# Patient Record
Sex: FEMALE | Race: WHITE | NOT HISPANIC OR LATINO | Employment: UNEMPLOYED | URBAN - METROPOLITAN AREA
[De-identification: names, ages, dates, MRNs, and addresses within clinical notes are randomized per-mention and may not be internally consistent; named-entity substitution may affect disease eponyms.]

---

## 2017-12-26 ENCOUNTER — OFFICE VISIT (OUTPATIENT)
Dept: URGENT CARE | Facility: CLINIC | Age: 57
End: 2017-12-26
Payer: COMMERCIAL

## 2017-12-26 PROCEDURE — G0382 LEV 3 HOSP TYPE B ED VISIT: HCPCS

## 2017-12-27 NOTE — PROGRESS NOTES
Assessment   1  Cellulitis (512 9) (L03 [id number])    Plan   Cellulitis    · Clindamycin HCl - 300 MG Oral Capsule; TAKE 1 CAPSULE 3 times daily    Discussion/Summary   Discussion Summary:    Follow up with an eye doctor in 48 hrs if no improvement  Medication Side Effects Reviewed: Possible side effects of new medications were reviewed with the patient/guardian today  Understands and agrees with treatment plan: The treatment plan was reviewed with the patient/guardian  The patient/guardian understands and agrees with the treatment plan    Counseling Documentation With Imm: The patient was counseled regarding instructions for management,-- patient and family education,-- importance of compliance with treatment  Chief Complaint   1  Eye Pain  Chief Complaint Free Text Note Form: C/O red, raised lump on her right upper eyelid x 16 days  Yesterday the area drained small amount of clear liquid and became painful  Pt has been using warm compresses on the area  History of Present Illness   HPI: Started with swelling of lateral aspect of right upper eyelid 16 days ago  Was using warm compresses and area opened yesterday draining clear fluid  Today area is slightly red and is now tender to touch  Hospital Based Practices Required Assessment:      Pain Assessment      the patient states they have pain  The pain is located in the right upper eyelid  The patient describes the pain as aching  (on a scale of 0 to 10, the patient rates the pain at 3 )      Abuse And Domestic Violence Screen       Yes, the patient is safe at home  -- The patient states no one is hurting them  Depression And Suicide Screen  No, the patient has not had thoughts of hurting themself  No, the patient has not felt depressed in the past 7 days  Eye Pain: Clarissa Hamilton presents with complaints of eye pain  Review of Systems   Focused-Female:      Constitutional: no fever-- and-- no chills        ENT: no sore throat,-- no hearing loss-- and-- no hoarseness  Cardiovascular: no chest pain  Respiratory: no cough  Gastrointestinal: no abdominal pain  Musculoskeletal: no myalgias  Integumentary: no rashes  Neurological: no headache  ROS Reviewed:    ROS reviewed  Past Medical History   1  No pertinent past medical history  Active Problems And Past Medical History Reviewed: The active problems and past medical history were reviewed and updated today  Social History    · Never a smoker  Social History Reviewed: The social history was reviewed and updated today  Surgical History   1  Denied: History Of Prior Surgery    Current Meds    1  [facility] CAPS; Therapy: (Recorded:[id number]) to Recorded  Medication List Reviewed: The medication list was reviewed and updated today  Allergies   1  Penicillins    Vitals   Signs   Recorded: [id number] 11:08AM   Temperature: 97 8 F  Heart Rate: 86  Respiration: 18  Systolic: 025  Diastolic: 86  O2 Saturation: 100    Physical Exam        Constitutional      General appearance: No acute distress, well appearing and well nourished  Eyes right upper lateral eyelid with redness, swelling, no drainage  Pupils and irises: Equal, round and reactive to light  Ears, Nose, Mouth, and Throat      External inspection of ears and nose: Normal        Pulmonary      Respiratory effort: No increased work of breathing or signs of respiratory distress  Lymphatic      Palpation of lymph nodes in neck: No lymphadenopathy  Musculoskeletal      Gait and station: Normal        Skin      Skin and subcutaneous tissue: Normal without rashes or lesions         Psychiatric      Mood and affect: Normal        Signatures    Electronically signed by : REJI Gordon; Dec 26 2017 11:36AM EST                       (Author)     Electronically signed by : BROCK Yepez ; Dec 26 2017 11:55AM EST                       (Co-author)

## 2018-01-23 VITALS
OXYGEN SATURATION: 100 % | SYSTOLIC BLOOD PRESSURE: 132 MMHG | HEART RATE: 86 BPM | DIASTOLIC BLOOD PRESSURE: 86 MMHG | RESPIRATION RATE: 18 BRPM | TEMPERATURE: 97.8 F

## 2018-10-02 ENCOUNTER — TRANSCRIBE ORDERS (OUTPATIENT)
Dept: ADMINISTRATIVE | Facility: HOSPITAL | Age: 58
End: 2018-10-02

## 2018-10-02 DIAGNOSIS — Z12.39 SCREENING BREAST EXAMINATION: Primary | ICD-10-CM

## 2018-10-25 ENCOUNTER — HOSPITAL ENCOUNTER (OUTPATIENT)
Dept: MAMMOGRAPHY | Facility: HOSPITAL | Age: 58
Discharge: HOME | End: 2018-10-25
Payer: COMMERCIAL

## 2018-10-25 DIAGNOSIS — Z12.39 SCREENING BREAST EXAMINATION: ICD-10-CM

## 2018-10-25 PROCEDURE — 77067 SCR MAMMO BI INCL CAD: CPT

## 2018-10-25 PROCEDURE — 77063 BREAST TOMOSYNTHESIS BI: CPT

## 2019-10-03 ENCOUNTER — TRANSCRIBE ORDERS (OUTPATIENT)
Dept: ADMINISTRATIVE | Facility: HOSPITAL | Age: 59
End: 2019-10-03

## 2019-10-03 DIAGNOSIS — Z12.31 SCREENING MAMMOGRAM FOR HIGH-RISK PATIENT: Primary | ICD-10-CM

## 2019-10-28 ENCOUNTER — HOSPITAL ENCOUNTER (OUTPATIENT)
Dept: MAMMOGRAPHY | Facility: HOSPITAL | Age: 59
Discharge: HOME | End: 2019-10-28
Payer: COMMERCIAL

## 2019-10-28 VITALS — HEIGHT: 65 IN | BODY MASS INDEX: 31.16 KG/M2 | WEIGHT: 187 LBS

## 2019-10-28 DIAGNOSIS — Z12.31 SCREENING MAMMOGRAM FOR HIGH-RISK PATIENT: ICD-10-CM

## 2019-10-28 PROCEDURE — 77063 BREAST TOMOSYNTHESIS BI: CPT

## 2019-10-28 PROCEDURE — 77067 SCR MAMMO BI INCL CAD: CPT

## 2020-09-23 ENCOUNTER — TELEPHONE (OUTPATIENT)
Dept: PAIN MEDICINE | Facility: CLINIC | Age: 60
End: 2020-09-23

## 2020-09-24 ENCOUNTER — OFFICE VISIT (OUTPATIENT)
Dept: OBGYN CLINIC | Facility: CLINIC | Age: 60
End: 2020-09-24
Payer: COMMERCIAL

## 2020-09-24 ENCOUNTER — APPOINTMENT (OUTPATIENT)
Dept: RADIOLOGY | Facility: CLINIC | Age: 60
End: 2020-09-24
Payer: COMMERCIAL

## 2020-09-24 VITALS
TEMPERATURE: 97.9 F | HEART RATE: 90 BPM | WEIGHT: 202 LBS | DIASTOLIC BLOOD PRESSURE: 82 MMHG | HEIGHT: 65 IN | BODY MASS INDEX: 33.66 KG/M2 | SYSTOLIC BLOOD PRESSURE: 127 MMHG

## 2020-09-24 DIAGNOSIS — M17.10 PRIMARY LOCALIZED OSTEOARTHRITIS OF KNEE: Primary | ICD-10-CM

## 2020-09-24 DIAGNOSIS — M25.561 RIGHT KNEE PAIN, UNSPECIFIED CHRONICITY: ICD-10-CM

## 2020-09-24 PROCEDURE — 99203 OFFICE O/P NEW LOW 30 MIN: CPT | Performed by: ORTHOPAEDIC SURGERY

## 2020-09-24 PROCEDURE — 73564 X-RAY EXAM KNEE 4 OR MORE: CPT

## 2020-09-24 NOTE — ASSESSMENT & PLAN NOTE
57-year-old female with very mild osteoarthritis of the knee  We discussed the diagnosis and reviewed the radiographs  I discussed anti-inflammatories, icing, low-impact exercise, weight loss  We also discussed different types of injections  At this point I do not recommended injection and she is in agreement with this  She has increasing pain or flare-ups of be happy to see her back for injection consideration  She shows good understanding of the process  Follow-up as needed

## 2020-09-24 NOTE — PROGRESS NOTES
Assessment:     1  Primary localized osteoarthritis of knee          Plan:     Problem List Items Addressed This Visit        Musculoskeletal and Integument    Primary localized osteoarthritis of knee - Primary     53-year-old female with very mild osteoarthritis of the knee  We discussed the diagnosis and reviewed the radiographs  I discussed anti-inflammatories, icing, low-impact exercise, weight loss  We also discussed different types of injections  At this point I do not recommended injection and she is in agreement with this  She has increasing pain or flare-ups of be happy to see her back for injection consideration  She shows good understanding of the process  Follow-up as needed  Relevant Orders    XR knee 4+ vw right injury           Patient ID: Gopi Gallegos is a 64 y o  female  Chief Complaint:  Right knee pain    HPI:    53-year-old female here today for problem she has been having with her right knee  This been going on for nearly two years  There is no specific injury but she does state that she has had multiple falls  Her main complaint is that she has pain deep in the knee when she kneels  She gets occasional swelling that her  is noted but she does not feel  She walks on a regular basis  It bothers her more with weather changes  She points to the front of the knee but states it is deep inside when it hurts  She denies any locking, catching, or giving way  She has not had any treatment for it  She has not done any therapy or had any injections  She does not wear any brace  She is just wondering what is going on with that and was curious what can be done for it  Allergy:  Allergies   Allergen Reactions    Penicillins Rash       Medications:  all current active meds have been reviewed    Past Medical History:  History reviewed  No pertinent past medical history      Past Surgical History:  Past Surgical History:   Procedure Laterality Date    ANKLE FRACTURE SURGERY Right 2010       Family History:  Family History   Problem Relation Age of Onset    Heart disease Mother     Hypertension Mother     Lung cancer Father     Stomach cancer Father     No Known Problems Sister     No Known Problems Maternal Grandmother     No Known Problems Maternal Grandfather     No Known Problems Paternal Grandmother     No Known Problems Paternal Grandfather     No Known Problems Sister     No Known Problems Sister     No Known Problems Maternal Aunt        Social History:  Social History     Substance and Sexual Activity   Alcohol Use Not Currently     Social History     Substance and Sexual Activity   Drug Use Not Currently     Social History     Tobacco Use   Smoking Status Never Smoker   Smokeless Tobacco Never Used           ROS:  Review of Systems   Constitutional: Negative  HENT: Negative  Eyes: Negative  Respiratory: Negative  Cardiovascular: Negative  Gastrointestinal: Negative  Endocrine: Negative  Genitourinary: Negative  Musculoskeletal: Positive for arthralgias  Skin: Negative  Allergic/Immunologic: Negative  Neurological: Negative  Hematological: Negative  Psychiatric/Behavioral: Negative  Objective:  BP Readings from Last 1 Encounters:   09/24/20 127/82      Wt Readings from Last 1 Encounters:   09/24/20 91 6 kg (202 lb)        BMI:   Estimated body mass index is 33 61 kg/m² as calculated from the following:    Height as of this encounter: 5' 5" (1 651 m)  Weight as of this encounter: 91 6 kg (202 lb)  EXAM:   Physical Exam  Vitals signs reviewed  Constitutional:       General: She is not in acute distress  Appearance: She is well-developed  She is not diaphoretic  HENT:      Head: Normocephalic and atraumatic  Eyes:      General:         Right eye: No discharge  Left eye: No discharge  Neck:      Musculoskeletal: Normal range of motion and neck supple  Trachea: No tracheal deviation  Cardiovascular:      Rate and Rhythm: Normal rate and regular rhythm  Pulmonary:      Effort: Pulmonary effort is normal  No respiratory distress  Breath sounds: Normal breath sounds  Chest:      Chest wall: No tenderness  Abdominal:      General: There is no distension  Palpations: Abdomen is soft  Tenderness: There is no abdominal tenderness  Musculoskeletal:      Right knee: She exhibits no effusion  Left knee: She exhibits no effusion  Skin:     General: Skin is warm and dry  Findings: No erythema  Neurological:      Mental Status: She is alert  Psychiatric:         Behavior: Behavior normal        Right Knee Exam   Right knee exam is normal     Muscle Strength   The patient has normal right knee strength  Tenderness   The patient is experiencing no tenderness  Range of Motion   The patient has normal right knee ROM  Tests   Rey:  Medial - negative Lateral - negative  Varus: negative Valgus: negative  Lachman:  Anterior - negative      Drawer:  Posterior - negative  Pivot shift: negative  Patellar apprehension: negative    Other   Erythema: absent  Sensation: normal  Pulse: present  Swelling: none  Effusion: no effusion present      Left Knee Exam   Left knee exam is normal     Tenderness   The patient is experiencing no tenderness  Range of Motion   The patient has normal left knee ROM  Tests   Rey:  Medial - negative Lateral - negative  Varus: negative Valgus: negative  Lachman:  Anterior - negative      Drawer:  Posterior - negative  Pivot shift: negative  Patellar apprehension: negative    Other   Erythema: absent  Sensation: normal  Pulse: present  Swelling: none  Effusion: no effusion present              Radiographs:  I have personally reviewed pertinent films in PACS and my interpretation is Mild degenerative changes of the medial and patellofemoral compartment  No fractures or dislocations  Congruent patellofemoral joint

## 2020-10-10 ENCOUNTER — TRANSCRIBE ORDERS (OUTPATIENT)
Dept: ADMINISTRATIVE | Facility: HOSPITAL | Age: 60
End: 2020-10-10

## 2020-10-10 DIAGNOSIS — Z12.31 ENCOUNTER FOR SCREENING MAMMOGRAM FOR MALIGNANT NEOPLASM OF BREAST: Primary | ICD-10-CM

## 2020-11-02 ENCOUNTER — HOSPITAL ENCOUNTER (OUTPATIENT)
Dept: MAMMOGRAPHY | Facility: HOSPITAL | Age: 60
Discharge: HOME | End: 2020-11-02
Payer: COMMERCIAL

## 2020-11-02 VITALS — HEIGHT: 65 IN | WEIGHT: 203 LBS | BODY MASS INDEX: 33.82 KG/M2

## 2020-11-02 DIAGNOSIS — Z12.31 ENCOUNTER FOR SCREENING MAMMOGRAM FOR MALIGNANT NEOPLASM OF BREAST: ICD-10-CM

## 2020-11-02 PROCEDURE — 77063 BREAST TOMOSYNTHESIS BI: CPT

## 2020-11-02 PROCEDURE — 77067 SCR MAMMO BI INCL CAD: CPT

## 2021-03-10 ENCOUNTER — IMMUNIZATIONS (OUTPATIENT)
Dept: FAMILY MEDICINE CLINIC | Facility: HOSPITAL | Age: 61
End: 2021-03-10

## 2021-03-10 DIAGNOSIS — Z23 ENCOUNTER FOR IMMUNIZATION: Primary | ICD-10-CM

## 2021-03-10 PROCEDURE — 91300 SARS-COV-2 / COVID-19 MRNA VACCINE (PFIZER-BIONTECH) 30 MCG: CPT

## 2021-03-10 PROCEDURE — 0001A SARS-COV-2 / COVID-19 MRNA VACCINE (PFIZER-BIONTECH) 30 MCG: CPT

## 2021-03-31 ENCOUNTER — IMMUNIZATIONS (OUTPATIENT)
Dept: FAMILY MEDICINE CLINIC | Facility: HOSPITAL | Age: 61
End: 2021-03-31

## 2021-03-31 DIAGNOSIS — Z23 ENCOUNTER FOR IMMUNIZATION: Primary | ICD-10-CM

## 2021-03-31 PROCEDURE — 0002A SARS-COV-2 / COVID-19 MRNA VACCINE (PFIZER-BIONTECH) 30 MCG: CPT

## 2021-03-31 PROCEDURE — 91300 SARS-COV-2 / COVID-19 MRNA VACCINE (PFIZER-BIONTECH) 30 MCG: CPT

## 2021-11-03 ENCOUNTER — HOSPITAL ENCOUNTER (OUTPATIENT)
Dept: MAMMOGRAPHY | Facility: HOSPITAL | Age: 61
Discharge: HOME | End: 2021-11-03
Payer: COMMERCIAL

## 2021-11-03 VITALS — HEIGHT: 65 IN | BODY MASS INDEX: 33.32 KG/M2 | WEIGHT: 200 LBS

## 2021-11-03 DIAGNOSIS — Z12.31 ENCOUNTER FOR SCREENING MAMMOGRAM FOR MALIGNANT NEOPLASM OF BREAST: ICD-10-CM

## 2021-11-03 PROCEDURE — 77063 BREAST TOMOSYNTHESIS BI: CPT

## 2021-11-03 PROCEDURE — 77067 SCR MAMMO BI INCL CAD: CPT

## 2022-05-20 DIAGNOSIS — R52 PAIN: Primary | ICD-10-CM

## 2022-05-28 ENCOUNTER — NURSE TRIAGE (OUTPATIENT)
Dept: OTHER | Facility: OTHER | Age: 62
End: 2022-05-28

## 2022-05-28 NOTE — TELEPHONE ENCOUNTER
Paged on call provider due to pt in 7/10 pain and doesn't want to go to ER  Was told several times she needs to see neurologist but cant get in to September  [facility] on call provider will contact pt directly  Reason for Disposition   [1] MILD-MODERATE mouth pain AND [2] present > 3 days    Answer Assessment - Initial Assessment Questions  1  ONSET: "When did the mouth start hurting?" (e g , hours or days ago)       Nerve pain since october  2  SEVERITY: "How bad is the pain?" (Scale 1-10; mild, moderate or severe)    - MILD (1-3):  doesn't interfere with eating or normal activities    - MODERATE (4-7): interferes with eating some solids and normal activities    - SEVERE (8-10):  excruciating pain, interferes with most normal activities    - SEVERE DYSPHAGIA: can't swallow liquids, drooling      7/10  3  SORES: "Are there any sores or ulcers in the mouth?" If Yes, ask: "What part of the mouth are the sores in?"      Nerve pain  4  FEVER: "Do you have a fever?" If Yes, ask: "What is your temperature, how was it measured, and when did it start?"      Low grade last night didn't meassure  5  CAUSE: "What do you think is causing the mouth pain?"      Nerve pain  6   OTHER SYMPTOMS: "Do you have any other symptoms?" (e g , difficulty breathing)      Headache, worsens when talk, eating, sleeping    Protocols used: MOUTH PAIN-ADULT-

## 2022-05-28 NOTE — TELEPHONE ENCOUNTER
Regarding: mouth pain   ----- Message from Osman Velazquez sent at 5/28/2022  8:45 AM EDT -----  "I had a tooth removed and they couldn't put the implant in because of the nerve  They don't know why it is hurting and want me to see a neurologist but I can't get in until September   I am having pain when I talk, eat, drink and I'm getting headaches and numbness in my lipbs

## 2022-11-04 ENCOUNTER — HOSPITAL ENCOUNTER (OUTPATIENT)
Dept: MAMMOGRAPHY | Facility: HOSPITAL | Age: 62
End: 2022-11-04
Attending: OBSTETRICS & GYNECOLOGY

## 2022-11-04 VITALS — BODY MASS INDEX: 34.99 KG/M2 | WEIGHT: 210 LBS | HEIGHT: 65 IN

## 2022-11-04 DIAGNOSIS — Z12.31 SCREENING MAMMOGRAM FOR HIGH-RISK PATIENT: ICD-10-CM

## 2022-12-28 ENCOUNTER — TELEPHONE (OUTPATIENT)
Dept: NEUROLOGY | Facility: CLINIC | Age: 62
End: 2022-12-28

## 2022-12-28 NOTE — TELEPHONE ENCOUNTER
Attempted to reach pt to offer earlier appt off the waitlist on 12/29 at 9 am  [facility]   Provided call back number and instructed pt to call back if interested in earlier appt   Stated that we could not guarantee the appt would still be available by the time they call back

## 2022-12-28 NOTE — TELEPHONE ENCOUNTER
Pt called back stating she will take the appointment for tomorrow 12/29 at 9am  Please call patient to confirm

## 2022-12-29 NOTE — TELEPHONE ENCOUNTER
Called patient and left a message  Advised her that the appointment, for today at 9am was filled  Advised her she will keep her May appointment, and she is still on the wait list  If we get another sooner appointment, we will callback to offer one if that does happen  Apologized, and advised the patient to callback w/any questions/concerns

## 2023-03-28 ENCOUNTER — TELEPHONE (OUTPATIENT)
Dept: NEUROLOGY | Facility: CLINIC | Age: 63
End: 2023-03-28

## 2023-03-28 NOTE — TELEPHONE ENCOUNTER
Called pt from Dr Jewell Victoria wait list to offer her 3-29-23 at 2 pm with him in [place] and she accepted

## 2023-03-29 ENCOUNTER — CONSULT (OUTPATIENT)
Dept: NEUROLOGY | Facility: CLINIC | Age: 63
End: 2023-03-29

## 2023-03-29 VITALS
HEART RATE: 77 BPM | HEIGHT: 65 IN | SYSTOLIC BLOOD PRESSURE: 136 MMHG | WEIGHT: 223.4 LBS | DIASTOLIC BLOOD PRESSURE: 82 MMHG | BODY MASS INDEX: 37.22 KG/M2 | OXYGEN SATURATION: 97 % | TEMPERATURE: 98.3 F

## 2023-03-29 DIAGNOSIS — G44.89 HEADACHE SYNDROME: ICD-10-CM

## 2023-03-29 DIAGNOSIS — Z13.89 OBESITY SCREEN: ICD-10-CM

## 2023-03-29 DIAGNOSIS — G43.109 MIGRAINE AURA WITHOUT HEADACHE: Primary | ICD-10-CM

## 2023-03-29 DIAGNOSIS — R29.818 SUSPECTED SLEEP APNEA: ICD-10-CM

## 2023-03-29 RX ORDER — FEXOFENADINE HYDROCHLORIDE 60 MG/1
60 TABLET, FILM COATED ORAL DAILY
COMMUNITY

## 2023-03-29 NOTE — PROGRESS NOTES
[facility] Consult  PATIENT:  Lety Echevarria  MRN:  [id number]  :  1960  DATE OF SERVICE:  3/29/2023  REFERRED BY: Geeta John DMD  PMD: Uriel Carroll    Assessment/Plan:     Lety Echevarria is a delightful 63 y o  female with a past medical history that includes allergies referred here for evaluation of headache  Initial evaluation 3/29/2023     Ms Tory Thurman reports a longstanding history of headaches since she was about 29years old, but reports that over the last year she started to develop a different type of headache located over the right frontal region of her head  When this pain initially started, she had recently undergone some dental work and was also experiencing jaw pain at that time, but that has since improved  Furthermore, she also endorses a visual aura a few times per month, but does not have any associated headache  I believe she may be suffering from a combination of migraine aura without headaches as well as possible tension headaches  I have recommended that we try magnesium and B2 supplements for prevention  There may be a component of sleep apnea contributing to her symptoms and I recommended that she undergo a sleep study for evaluation, but she was not interested in obtaining this at this time    Due to a change in her headache type as well as the significant frequency at this time, I would like further evaluation with an MRI of the brain with and without contrast     Migraine aura without headache  Headache syndrome  Suspected sleep apnea  -     MRI brain with and without contrast; Future    Workup:  - Neurologic assessment reveals unremarkable neurological exam   - Due to increased frequency and severity of headaches and migraines I recommend further evaluation with MRI brain with and without contrast to rule out structural or treatable causes of symptoms     Preventative:  - we discussed headache hygiene and lifestyle factors that may improve headaches  - Mg and B2  - Currently on through other providers: None  - Past/ failed/contraindicated: None  - future options: Topamax, TCA/SNRI, CGRP med, botox    Acute:  - discussed not taking over-the-counter or prescription pain medications more than 2-3 days per week to prevent medication overuse/rebound headache  - Currently on through other providers: None  - Past/ failed/contraindicated: None  - future options:  Triptan, prochlorperazine, Toradol IM or p o , could consider trial of 5 days of Depakote 500 mg nightly or dexamethasone 2 mg daily for prolonged migraine, jacey Duarte nurtec  Patient instructions   Additional Testing:   Neurodiagnostic workup: MRI Brain ordered    Headache Calendar  Please maintain a headache calendar  Consider using phone applications such as Migraine [facility] or [employer] [facility] Migraine Tracker    Headache/migraine treatment:   Acute medications (for immediate treatment of a headache): It is ok to take ibuprofen, acetaminophen or naproxen (Advil, Tylenol,  Aleve, Excedrin) if they help your headaches you should limit these to No more than 2-3 times a week to avoid medication overuse/rebound headaches  Over the counter preventive supplements for headaches/migraines (if you try, try for 3 months straight)  (to take every day to help prevent headaches - not to take at the time of headache):  [x] Magnesium 400mg daily (If any diarrhea or upset stomach, decrease dose  as tolerated) - oxide or glycinate  [x] Riboflavin (Vitamin B2) 400mg daily - (FYI B2 may make your urine bright/neon yellow)    Lifestyle Recommendations:  [x] SLEEP - Maintain a regular sleep schedule: Adults need at least 7-8 hours of uninterrupted a night   Maintain good sleep hygiene:  Going to bed and waking up at consistent times, avoiding excessive daytime naps, avoiding caffeinated beverages in the evening, avoid excessive stimulation in the evening and generally using bed primarily for "sleeping  One hour before bedtime would recommend turning lights down lower, decreasing your activity (may read quietly, listen to music at a low volume)  When you get into bed, should eliminate all technology (no texting, emailing, playing with your phone, iPad or tablet in bed)  [x] HYDRATION - Maintain good hydration  Drink  2L of fluid a day (4 typical small water bottles)  [x] DIET - Maintain good nutrition  In particular don't skip meals and try and eat healthy balanced meals regularly  [x] TRIGGERS - Look for other triggers and avoid them: Limit caffeine to 1-2 cups a day or less  Avoid dietary triggers that you have noticed bring on your headaches (this could include aged cheese, peanuts, MSG, aspartame and nitrates)  [x] EXERCISE - physical exercise as we all know is good for you in many ways, and not only is good for your heart, but also is beneficial for your mental health, cognitive health and  chronic pain/headaches  I would encourage at the least 5 days of physical exercise weekly for at least 30 minutes  Education and Follow-up  [x] Please call with any questions or concerns  Of course if any new concerning symptoms go to the emergency department  [x] Follow up in 6 months   CC: We had the pleasure of evaluating Ander Can in neurological consultation today  Ander Can is a  right handed female who presents today for evaluation of headaches  History obtained from patient as well as available medical record review  History of Present Illness:   Current medical illnesses  or past medical history include allergies      Headaches started at what age? 29years old  How often do the headaches occur?   - as of 3/29/2023: 28/30  What time of the day do the headaches start? No particular time of day  How long do the headaches last? 5-10 minutes (2 times per day at most)  Are you ever headache free?  No    Aura? with aura - describes flashing \"worm\" in her vision (happens about 4-5 times " per month; not associated with headache)     Where is your headache located and pain quality? Right frontal; aching pain  What is the intensity of pain? Worst 3-4/10, Average: 2-3/10  Associated symptoms: None    Things that make the headache worse? No specific movements    Headache triggers:  None    Have you seen someone else for headaches or pain? No  Have you had trigger point injection performed and how often? No  Have you had Botox injection performed and how often? No   Have you had epidural injections or transforaminal injections performed? No  Are you current pregnant or planning on getting pregnant? N/A  Have you ever had any Brain imaging? no    Last eye exam: April 2022 - normal, dilated exam    What medications do you take or have you taken for your headaches?    ABORTIVE:    OTC medications: Tylenol (not often)  Prescription: None    Past/ failed/contraindicated:  OTC medications: None  Prescription: None    PREVENTIVE:   None    Past/ failed/contraindicated:  None    LIFESTYLE  Sleep   - averages: about 5 hours per night  Problems falling asleep?:   Yes, sometimes  Problems staying asleep?:  No  - Positive history of snoring  - No prior sleep studies    Physical activity: Exercises about 5-6 days per week    Water: about 8-10 cups per day  Caffeine: None    Mood:   Denies history of anxiety or depression or other diagnosed mood disorder    The following portions of the patient's history were reviewed and updated as appropriate: allergies, current medications, past family history, past medical history, past social history, past surgical history and problem list     Pertinent family history:  Family history of headaches:  no known family members with significant headaches  Any family history of aneurysms - No    Pertinent social history:  Work: Not working  Education: 2 years college  Lives with     Illicit Drugs: denies  Alcohol/tobacco: Denies tobacco use, alcohol intake: social drinker    Past Medical History:   History reviewed  No pertinent past medical history  Patient Active Problem List   Diagnosis   • Primary localized osteoarthritis of knee       Medications:      Current Outpatient Medications   Medication Sig Dispense Refill   • fexofenadine (ALLEGRA) 60 MG tablet Take 60 mg by mouth daily       No current facility-administered medications for this visit  Allergies: Allergies   Allergen Reactions   • Penicillins Rash       Family History:     Family History   Problem Relation Age of Onset   • Heart disease Mother    • Hypertension Mother    • Lung cancer Father    • Stomach cancer Father    • No Known Problems Sister    • No Known Problems Maternal Grandmother    • No Known Problems Maternal Grandfather    • No Known Problems Paternal Grandmother    • No Known Problems Paternal Grandfather    • No Known Problems Sister    • No Known Problems Sister    • No Known Problems Maternal Aunt        Social History:       Social History     Socioeconomic History   • Marital status: /Civil Union     Spouse name: Not on file   • Number of children: Not on file   • Years of education: Not on file   • Highest education level: Not on file   Occupational History   • Not on file   Tobacco Use   • Smoking status: Never   • Smokeless tobacco: Never   Vaping Use   • Vaping Use: Never used   Substance and Sexual Activity   • Alcohol use:  Yes   • Drug use: Not Currently   • Sexual activity: Not Currently   Other Topics Concern   • Not on file   Social History Narrative   • Not on file     Social Determinants of Health     Financial Resource Strain: Not on file   Food Insecurity: Not on file   Transportation Needs: Not on file   Physical Activity: Not on file   Stress: Not on file   Social Connections: Not on file   Intimate Partner Violence: Not on file   Housing Stability: Not on file     Objective:   Physical Exam: "Vitals:            Constitutional:  /82 (BP Location: Left arm, Patient Position: Sitting, Cuff Size: Large)   Pulse 77   Temp 98 3 °F (36 8 °C) (Temporal)   Ht 5' 5\" (1 651 m)   Wt 101 kg (223 lb 6 4 oz)   SpO2 97%   BMI 37 18 kg/m²   BP Readings from Last 3 Encounters:   03/29/23 136/82   09/24/20 127/82   12/26/17 132/86     Pulse Readings from Last 3 Encounters:   03/29/23 77   09/24/20 90   12/26/17 86         Well developed, well nourished, well groomed  No dysmorphic features  HEENT:  Normocephalic atraumatic  Oropharynx is clear and moist  No oral mucosal lesions  Chest:  Respirations regular and unlabored  Cardiovascular:  Distal extremities warm without palpable edema or tenderness, no observed significant swelling  Musculoskeletal:  (see below under neurologic exam for evaluation of motor function and gait)   Skin:  warm and dry, not diaphoretic  No apparent birthmarks or stigmata of neurocutaneous disease  Psychiatric:  Normal behavior and appropriate affect       Neurological Examination:     Mental status/cognitive function:   Orientated to time, place and person  Recent and remote memory intact  Attention span and concentration as well as fund of knowledge are appropriate for age  Normal language and spontaneous speech  Cranial Nerves:  II-visual fields full  Fundi poorly visualized due to pupillary constriction  III, IV, VI-Pupils were equal, round, and reactive to light and accomodation  Extraocular movements were full and conjugate without nystagmus  Conjugate gaze, normal smooth pursuits, normal saccades   V-facial sensation symmetric  VII-facial expression symmetric, intact forehead wrinkle, strong eye closure, symmetric smile    VIII-hearing grossly intact bilaterally   IX, X-palate elevation symmetric, no dysarthria  XI-shoulder shrug strength intact    XII-tongue protrusion midline  Motor Exam: symmetric bulk and tone throughout, no pronator drift   " Power/strength 5/5 bilateral upper and lower extremities, no atrophy, fasciculations or abnormal movements noted  Sensory: grossly intact light touch in all extremities  Reflexes: brachioradialis 2+, biceps 2+, knee 2+, ankle 2+ bilaterally  No ankle clonus  Coordination: Finger nose finger intact bilaterally, no apparent dysmetria, ataxia or tremor noted  Gait: steady casual and tandem gait  Pertinent lab results: None     Pertinent Imaging: None  Review of Systems:   Constitutional: Negative  Negative for appetite change and fever  HENT: Negative  Negative for hearing loss, tinnitus, trouble swallowing and voice change  Eyes: Negative  Negative for photophobia, pain and visual disturbance  Respiratory: Negative  Negative for shortness of breath  Cardiovascular: Negative  Negative for palpitations  Gastrointestinal: Negative  Negative for nausea and vomiting  Endocrine: Negative  Negative for cold intolerance  Genitourinary: Negative  Negative for dysuria, frequency and urgency  Musculoskeletal: Negative  Negative for gait problem, myalgias and neck pain  Skin: Negative  Negative for rash  Allergic/Immunologic: Negative  Neurological: Positive for headaches  Negative for dizziness, tremors, seizures, syncope, facial asymmetry, speech difficulty, weakness, light-headedness and numbness  Hematological: Negative  Does not bruise/bleed easily  Psychiatric/Behavioral: Negative  Negative for confusion, hallucinations and sleep disturbance  All other systems reviewed and are negative  I have spent 30 minutes with the patient today in which greater than 50% of this time was spent in counseling/coordination of care regarding Prognosis, Risks and benefits of tx options, Patient and family education, Impressions, Documenting in the medical record and Obtaining or reviewing history    I also spent 10 minutes non face to face for this patient the same day       Activity Minutes   Precharting/reviewing 5   Patient care/counseling 30   Postcharting/care coordination 5       Author:  Kiera Ibarra DO 3/29/2023 2:07 PM

## 2023-03-29 NOTE — PATIENT INSTRUCTIONS
Additional Testing:   Neurodiagnostic workup: MRI Brain ordered    Headache Calendar  Please maintain a headache calendar  Consider using phone applications such as Migraine [facility] or [facility] Migraine Tracker    Headache/migraine treatment:   Acute medications (for immediate treatment of a headache): It is ok to take ibuprofen, acetaminophen or naproxen (Advil, Tylenol,  Aleve, Excedrin) if they help your headaches you should limit these to No more than 2-3 times a week to avoid medication overuse/rebound headaches  Over the counter preventive supplements for headaches/migraines (if you try, try for 3 months straight)  (to take every day to help prevent headaches - not to take at the time of headache):  [x] Magnesium 400mg daily (If any diarrhea or upset stomach, decrease dose  as tolerated) - oxide or glycinate  [x] Riboflavin (Vitamin B2) 400mg daily - (FYI B2 may make your urine bright/neon yellow)    Lifestyle Recommendations:  [x] SLEEP - Maintain a regular sleep schedule: Adults need at least 7-8 hours of uninterrupted a night  Maintain good sleep hygiene:  Going to bed and waking up at consistent times, avoiding excessive daytime naps, avoiding caffeinated beverages in the evening, avoid excessive stimulation in the evening and generally using bed primarily for sleeping  One hour before bedtime would recommend turning lights down lower, decreasing your activity (may read quietly, listen to music at a low volume)  When you get into bed, should eliminate all technology (no texting, emailing, playing with your phone, iPad or tablet in bed)  [x] HYDRATION - Maintain good hydration  Drink  2L of fluid a day (4 typical small water bottles)  [x] DIET - Maintain good nutrition  In particular don't skip meals and try and eat healthy balanced meals regularly  [x] TRIGGERS - Look for other triggers and avoid them: Limit caffeine to 1-2 cups a day or less   Avoid dietary triggers that you have noticed bring on your headaches (this could include aged cheese, peanuts, MSG, aspartame and nitrates)  [x] EXERCISE - physical exercise as we all know is good for you in many ways, and not only is good for your heart, but also is beneficial for your mental health, cognitive health and  chronic pain/headaches  I would encourage at the least 5 days of physical exercise weekly for at least 30 minutes  Education and Follow-up  [x] Please call with any questions or concerns  Of course if any new concerning symptoms go to the emergency department    [x] Follow up in 6 months

## 2023-04-19 ENCOUNTER — PATIENT MESSAGE (OUTPATIENT)
Dept: NEUROLOGY | Facility: CLINIC | Age: 63
End: 2023-04-19

## 2023-04-19 DIAGNOSIS — F40.240 CLAUSTROPHOBIA: ICD-10-CM

## 2023-04-19 DIAGNOSIS — G43.109 MIGRAINE AURA WITHOUT HEADACHE: Primary | ICD-10-CM

## 2023-04-24 RX ORDER — LORAZEPAM 0.5 MG/1
0.5 TABLET ORAL
Qty: 2 TABLET | Refills: 0 | Status: SHIPPED | OUTPATIENT
Start: 2023-04-24

## 2023-05-03 ENCOUNTER — TELEPHONE (OUTPATIENT)
Dept: NEUROLOGY | Facility: CLINIC | Age: 63
End: 2023-05-03

## 2023-05-03 NOTE — TELEPHONE ENCOUNTER
Received VM  Pt is at her MRI and forgot her script   ________________________________    Return call to pt  Pt stated she was able to complete her MRI  Nothing further needed

## 2023-05-07 DIAGNOSIS — H81.09 MENIERE DISEASE: Primary | ICD-10-CM

## 2023-05-07 NOTE — PROGRESS NOTES
MRI brain with and without contrast showed cochlear fluid signal asymmetry with a unilateral increase on the right  Plan to place ENT referral to consider further imaging if appropriate

## 2023-09-29 ENCOUNTER — TELEPHONE (OUTPATIENT)
Dept: NEUROLOGY | Facility: CLINIC | Age: 63
End: 2023-09-29

## 2023-09-29 NOTE — TELEPHONE ENCOUNTER
LMOM to confirm appt on 10//2/23 with Dr. Molly Cm in [facility]. Reminded pt to bring in image disc.

## 2023-10-02 ENCOUNTER — OFFICE VISIT (OUTPATIENT)
Dept: NEUROLOGY | Facility: CLINIC | Age: 63
End: 2023-10-02
Payer: COMMERCIAL

## 2023-10-02 VITALS
TEMPERATURE: 98.1 F | HEART RATE: 65 BPM | SYSTOLIC BLOOD PRESSURE: 138 MMHG | BODY MASS INDEX: 38.19 KG/M2 | WEIGHT: 229.2 LBS | OXYGEN SATURATION: 99 % | DIASTOLIC BLOOD PRESSURE: 90 MMHG | HEIGHT: 65 IN

## 2023-10-02 DIAGNOSIS — G43.109 MIGRAINE AURA WITHOUT HEADACHE: Primary | ICD-10-CM

## 2023-10-02 DIAGNOSIS — E66.9 OBESITY (BMI 30-39.9): ICD-10-CM

## 2023-10-02 PROCEDURE — 99214 OFFICE O/P EST MOD 30 MIN: CPT | Performed by: STUDENT IN AN ORGANIZED HEALTH CARE EDUCATION/TRAINING PROGRAM

## 2023-10-02 NOTE — PATIENT INSTRUCTIONS
Headache Calendar  Please maintain a headache calendar  Consider using phone applications such as Migraine [facility] or [employer] Migraine Tracker     Headache/migraine treatment:   Acute medications (for immediate treatment of a headache): It is ok to take ibuprofen, acetaminophen or naproxen (Advil, Tylenol,  Aleve, Excedrin) if they help your headaches you should limit these to No more than 2-3 times a week to avoid medication overuse/rebound headaches. Over the counter preventive supplements for headaches/migraines (if you try, try for 3 months straight)  (to take every day to help prevent headaches - not to take at the time of headache):  [x] Magnesium 400mg daily (If any diarrhea or upset stomach, decrease dose  as tolerated) - oxide or glycinate  [x] Riboflavin (Vitamin B2) 400mg daily - (FYI B2 may make your urine bright/neon yellow)     Lifestyle Recommendations:  [x] SLEEP - Maintain a regular sleep schedule: Adults need at least 7-8 hours of uninterrupted a night. Maintain good sleep hygiene:  Going to bed and waking up at consistent times, avoiding excessive daytime naps, avoiding caffeinated beverages in the evening, avoid excessive stimulation in the evening and generally using bed primarily for sleeping. One hour before bedtime would recommend turning lights down lower, decreasing your activity (may read quietly, listen to music at a low volume). When you get into bed, should eliminate all technology (no texting, emailing, playing with your phone, iPad or tablet in bed). [x] HYDRATION - Maintain good hydration. Drink  2L of fluid a day (4 typical small water bottles)  [x] DIET - Maintain good nutrition. In particular don't skip meals and try and eat healthy balanced meals regularly. [x] TRIGGERS - Look for other triggers and avoid them: Limit caffeine to 1-2 cups a day or less.  Avoid dietary triggers that you have noticed bring on your headaches (this could include aged cheese, peanuts, MSG, aspartame and nitrates). [x] EXERCISE - physical exercise as we all know is good for you in many ways, and not only is good for your heart, but also is beneficial for your mental health, cognitive health and  chronic pain/headaches. I would encourage at the least 5 days of physical exercise weekly for at least 30 minutes. Education and Follow-up  [x] Please call with any questions or concerns. Of course if any new concerning symptoms go to the emergency department.   [x] Follow up in 8 months

## 2023-10-02 NOTE — PROGRESS NOTES
Review of Systems   Constitutional: Negative for appetite change, fatigue and fever. HENT: Negative. Negative for hearing loss, tinnitus, trouble swallowing and voice change. Eyes: Negative. Negative for photophobia, pain and visual disturbance. Respiratory: Negative. Negative for shortness of breath. Cardiovascular: Negative. Negative for palpitations. Gastrointestinal: Negative. Negative for nausea and vomiting. Endocrine: Negative. Negative for cold intolerance. Genitourinary: Negative. Negative for dysuria, frequency and urgency. Musculoskeletal: Negative for back pain, gait problem, myalgias and neck pain. Skin: Negative. Negative for rash. Allergic/Immunologic: Negative. Neurological: Positive for headaches. Negative for dizziness, tremors, seizures, syncope, facial asymmetry, speech difficulty, weakness, light-headedness and numbness. Hematological: Negative. Does not bruise/bleed easily. Psychiatric/Behavioral: Negative. Negative for confusion, hallucinations and sleep disturbance. All other systems reviewed and are negative. Since your last visit are your headaches Improved    Any change to the headache type? NO    What is your current headache frequency:  2 times per day; every 3 days    Are you taking your current medications as prescribed? N/A    Do you have any side effects? N/A    How may days per week do you take an abortive medicine?  1/7

## 2023-10-02 NOTE — PROGRESS NOTES
[facility] Consult - Follow up   PATIENT:  Haris Jimenes  MRN:  [id number]  :  1960  DATE OF SERVICE:  10/2/2023  REFERRED BY: No ref. provider found  PMD: Mi Brown    Assessment/Plan:   Haris Jimenes is a delightful 63 y.o. female with a past medical history that includes allergies here for f/u evaluation of headache. At today's visit, she reports that her headaches have improved. She currently experiences about 4 headache days per week which is nearly half of what she was previously experiencing. She has been taking B2 supplements daily and magnesium supplements every other day. I have recommended that she try taking magnesium supplements daily as she does not report any GI issues with it. The last 6 months have been particularly stressful for her as her son was diagnosed with leukemia and is currently undergoing treatment for it. We decided that it would be best to continue with magnesium and B2 supplements for now, but I have asked her to keep me updated going forward should she have any issues or concerns. Workup:  - Neurologic assessment reveals unremarkable neurological exam.  - MRI brain with and without contrast 5/3/2023: Sinus inflammatory changes. No significant intracranial findings aside from mild cerebellar tonsillar ectopia.   Cochlear fluid signal asymmetry with unilateral increase in the right (I have personally reviewed imaging and radiology read)     Preventative:  - we discussed headache hygiene and lifestyle factors that may improve headaches  - Mg and B2  - Currently on through other providers: None  - Past/ failed/contraindicated: None  - future options: Topamax, TCA/SNRI, CGRP med, botox     Acute:  - discussed not taking over-the-counter or prescription pain medications more than 2-3 days per week to prevent medication overuse/rebound headache  - Currently on through other providers: None  - Past/ failed/contraindicated: None  - future options:  Triptan, prochlorperazine, Toradol IM or p.o., could consider trial of 5 days of Depakote 500 mg nightly or dexamethasone 2 mg daily for prolonged migraine, jacey Saenz nurtec  Patient instructions   Headache Calendar  Please maintain a headache calendar  Consider using phone applications such as Migraine [facility] or [facility] Migraine Tracker     Headache/migraine treatment:   Acute medications (for immediate treatment of a headache): It is ok to take ibuprofen, acetaminophen or naproxen (Advil, Tylenol,  Aleve, Excedrin) if they help your headaches you should limit these to No more than 2-3 times a week to avoid medication overuse/rebound headaches.      Over the counter preventive supplements for headaches/migraines (if you try, try for 3 months straight)  (to take every day to help prevent headaches - not to take at the time of headache):  [x]? Magnesium 400mg daily (If any diarrhea or upset stomach, decrease dose  as tolerated) - oxide or glycinate  [x]? Riboflavin (Vitamin B2) 400mg daily - (FYI B2 may make your urine bright/neon yellow)     Lifestyle Recommendations:  [x]? SLEEP - Maintain a regular sleep schedule: Adults need at least 7-8 hours of uninterrupted a night. Maintain good sleep hygiene:  Going to bed and waking up at consistent times, avoiding excessive daytime naps, avoiding caffeinated beverages in the evening, avoid excessive stimulation in the evening and generally using bed primarily for sleeping. One hour before bedtime would recommend turning lights down lower, decreasing your activity (may read quietly, listen to music at a low volume). When you get into bed, should eliminate all technology (no texting, emailing, playing with your phone, iPad or tablet in bed). [x]? HYDRATION - Maintain good hydration. Drink  2L of fluid a day (4 typical small water bottles)  [x]? DIET - Maintain good nutrition.  In particular don't skip meals and try and eat healthy balanced meals regularly. [x]? TRIGGERS - Look for other triggers and avoid them: Limit caffeine to 1-2 cups a day or less. Avoid dietary triggers that you have noticed bring on your headaches (this could include aged cheese, peanuts, MSG, aspartame and nitrates). [x]? EXERCISE - physical exercise as we all know is good for you in many ways, and not only is good for your heart, but also is beneficial for your mental health, cognitive health and  chronic pain/headaches. I would encourage at the least 5 days of physical exercise weekly for at least 30 minutes.      Education and Follow-up  [x]? Please call with any questions or concerns. Of course if any new concerning symptoms go to the emergency department. [x]? Follow up in 8 months   Subjective:   10/2/23: Since her last visit, she was evaluated by ENT with audiogram demonstrating evidence of high-frequency sensorineural hearing loss on the left and mixed hearing loss on the right. At today's visit, she reports improvement in terms of her headaches. Currently experiencing about 4 headache days per week. She is currently taking Mg every other day and B2 daily. Taking Tylenol less frequently. Previous History:  3/29/23: Ms. Noman Ha reports a longstanding history of headaches since she was about 29years old, but reports that over the last year she started to develop a different type of headache located over the right frontal region of her head. When this pain initially started, she had recently undergone some dental work and was also experiencing jaw pain at that time, but that has since improved. Furthermore, she also endorses a visual aura a few times per month, but does not have any associated headache. I believe she may be suffering from a combination of migraine aura without headaches as well as possible tension headaches. I have recommended that we try magnesium and B2 supplements for prevention.   There may be a component of sleep apnea contributing to her symptoms and I recommended that she undergo a sleep study for evaluation, but she was not interested in obtaining this at this time. Due to a change in her headache type as well as the significant frequency at this time, I would like further evaluation with an MRI of the brain with and without contrast.   Past Medical History:   History reviewed. No pertinent past medical history. Patient Active Problem List   Diagnosis   • Primary localized osteoarthritis of knee       Medications:      Current Outpatient Medications   Medication Sig Dispense Refill   • fexofenadine (ALLEGRA) 60 MG tablet Take 60 mg by mouth daily     • LORazepam (Ativan) 0.5 mg tablet Take 1 tablet (0.5 mg total) by mouth 30 min pre-procedure Take 2nd tablet just prior to start of procedure. 2 tablet 0     No current facility-administered medications for this visit. Allergies: Allergies   Allergen Reactions   • Penicillins Rash       Family History:     Family History   Problem Relation Age of Onset   • Heart disease Mother    • Hypertension Mother    • Lung cancer Father    • Stomach cancer Father    • No Known Problems Sister    • No Known Problems Maternal Grandmother    • No Known Problems Maternal Grandfather    • No Known Problems Paternal Grandmother    • No Known Problems Paternal Grandfather    • No Known Problems Sister    • No Known Problems Sister    • No Known Problems Maternal Aunt        Social History:     Social History     Socioeconomic History   • Marital status: /Civil Union     Spouse name: Not on file   • Number of children: Not on file   • Years of education: Not on file   • Highest education level: Not on file   Occupational History   • Not on file   Tobacco Use   • Smoking status: Never   • Smokeless tobacco: Never   Vaping Use   • Vaping Use: Never used   Substance and Sexual Activity   • Alcohol use:  Yes   • Drug use: Not Currently   • Sexual activity: Not Currently   Other Topics Concern   • Not on file   Social History Narrative   • Not on file     Social Determinants of Health     Financial Resource Strain: Not on file   Food Insecurity: Not on file   Transportation Needs: Not on file   Physical Activity: Not on file   Stress: Not on file   Social Connections: Not on file   Intimate Partner Violence: Not on file   Housing Stability: Not on file         Objective:   Physical Exam:                                                               Vitals:            Constitutional:  /90 (BP Location: Left arm, Patient Position: Sitting, Cuff Size: Adult)   Pulse 65   Temp 98.1 °F (36.7 °C) (Temporal)   Ht 5' 5" (1.651 m)   Wt 104 kg (229 lb 3.2 oz)   SpO2 99%   BMI 38.14 kg/m²   BP Readings from Last 3 Encounters:   10/02/23 138/90   03/29/23 136/82   09/24/20 127/82     Pulse Readings from Last 3 Encounters:   10/02/23 65   03/29/23 77   09/24/20 90         Well developed, well nourished, well groomed. No dysmorphic features. HEENT:  Normocephalic atraumatic. See neuro exam   Chest:  Respirations appear regular and unlabored. Cardiovascular:  no observed significant swelling. Musculoskeletal:  (see below under neurologic exam for evaluation of motor function and gait)   Skin:  warm and dry, not diaphoretic. Psychiatric:  Normal behavior and appropriate affect       Neurological Examination:     Mental status/cognitive function:   Recent and remote memory intact. Attention span and concentration as well as fund of knowledge are appropriate for age. Normal language and spontaneous speech. Cranial Nerves:  III, IV, VI-Pupils were equal, round. Extraocular movements were full and conjugate   VII-facial expression symmetric  VIII-hearing grossly intact bilaterally   Motor Exam: symmetric bulk throughout. no atrophy, fasciculations or abnormal movements noted.    Coordination:  no apparent dysmetria, ataxia or tremor noted  Gait: steady casual gait  Review of Systems:   Constitutional: Negative for appetite change, fatigue and fever. HENT: Negative. Negative for hearing loss, tinnitus, trouble swallowing and voice change. Eyes: Negative. Negative for photophobia, pain and visual disturbance. Respiratory: Negative. Negative for shortness of breath. Cardiovascular: Negative. Negative for palpitations. Gastrointestinal: Negative. Negative for nausea and vomiting. Endocrine: Negative. Negative for cold intolerance. Genitourinary: Negative. Negative for dysuria, frequency and urgency. Musculoskeletal: Negative for back pain, gait problem, myalgias and neck pain. Skin: Negative. Negative for rash. Allergic/Immunologic: Negative. Neurological: Positive for headaches. Negative for dizziness, tremors, seizures, syncope, facial asymmetry, speech difficulty, weakness, light-headedness and numbness. Hematological: Negative. Does not bruise/bleed easily. Psychiatric/Behavioral: Negative. Negative for confusion, hallucinations and sleep disturbance. All other systems reviewed and are negative. I have spent 15 minutes with patient today in which greater than 50% of this time was spent in counseling/coordination of care regarding Diagnostic results, Prognosis, Risks and benefits of tx options, Patient and family education, Impressions, Documenting in the medical record, Reviewing / ordering tests, medicine, procedures   and Obtaining or reviewing history  . I also spent 15 minutes non face to face for this patient the same day.      Activity Minutes   Precharting/reviewing 10   Patient care/counseling 15   Postcharting/care coordination 5       Author:  Jonny Tate DO 10/2/2023 12:23 PM

## 2024-04-30 ENCOUNTER — TELEPHONE (OUTPATIENT)
Dept: GASTROENTEROLOGY | Facility: CLINIC | Age: 64
End: 2024-04-30

## 2024-05-01 ENCOUNTER — OFFICE VISIT (OUTPATIENT)
Dept: GASTROENTEROLOGY | Facility: CLINIC | Age: 64
End: 2024-05-01
Payer: COMMERCIAL

## 2024-05-01 VITALS
WEIGHT: 225 LBS | BODY MASS INDEX: 37.49 KG/M2 | HEIGHT: 65 IN | TEMPERATURE: 96.9 F | SYSTOLIC BLOOD PRESSURE: 140 MMHG | DIASTOLIC BLOOD PRESSURE: 85 MMHG | OXYGEN SATURATION: 100 % | HEART RATE: 74 BPM

## 2024-05-01 DIAGNOSIS — R10.13 EPIGASTRIC PAIN: Primary | ICD-10-CM

## 2024-05-01 LAB
ALBUMIN SERPL-MCNC: 4.3 G/DL (ref 3.5–5.7)
ALP SERPL-CCNC: 95 U/L (ref 35–120)
ALT SERPL-CCNC: 39 U/L
AST SERPL-CCNC: 28 U/L
BILIRUB DIRECT SERPL-MCNC: 0.1 MG/DL (ref 0–0.2)
BILIRUB SERPL-MCNC: 0.5 MG/DL (ref 0.2–1)
PROT SERPL-MCNC: 7 G/DL (ref 6.3–8.3)

## 2024-05-01 PROCEDURE — 99204 OFFICE O/P NEW MOD 45 MIN: CPT | Performed by: INTERNAL MEDICINE

## 2024-05-01 RX ORDER — AZELASTINE HYDROCHLORIDE 137 UG/1
SPRAY, METERED NASAL
COMMUNITY
Start: 2024-04-23

## 2024-05-01 NOTE — PROGRESS NOTES
[facility] - Outpatient Consultation  Celestina Ash 63 y.o. female MRN: [id number]  Encounter: [id number]          ASSESSMENT AND PLAN:      1. Epigastric pain  -The intermittent, once a month frequency pattern of this pain is more consistent with gallstone disease and it is consistent with either gastric or duodenal mucosal inflammation or ulceration.  - Hepatic function panel; Future  - US right upper quadrant; Future  -We had a discussion about the game plan if her ultrasound and liver panel are completely normal.  The neck step would be a HIDA scan to rule out biliary dysfunction.  In addition, EGD would be medically necessary.  -There is no rationale for medical therapy at this time since the pain only last for 30 minutes once a month.  ______________________________________________________________________    HPI: Celestina is a 63-year-old female who presents to the office with a complaint of epigastric abdominal pain which began in November 2023.  She states that these episodes last for about 30 minutes each.  They are described as a sharp in quality type of pain.  It is nonradiating.  She denies any fever or chills but she does admit to sweats in association with the pain.  She denies nausea or vomiting, diarrhea or constipation.  She has had change of her bowel habits.  She describes stools that are rocklike at times and then she will have other days where she will have multiple formed diarrheas in a day.  There is no diarrhea complaint.  She denied rectal bleeding, melena, bloating, nausea, vomiting, heartburn, dysphagia, odynophagia but she does admit to gaseousness.  Both her mother and her sister were diagnosed with gallbladder disease.  Their gallbladders were removed years ago.  There is no family history for stomach cancer.  She had a colonoscopy performed 2 years ago on 9/19/2022 which demonstrated diverticulosis as well as hemorrhoids.      REVIEW OF  SYSTEMS:    CONSTITUTIONAL: Denies any fever, chills, rigors, and weight loss.  HEENT: No earache or tinnitus. Denies hearing loss or visual disturbances.  CARDIOVASCULAR: No chest pain or palpitations.   RESPIRATORY: Denies any cough, hemoptysis, shortness of breath or dyspnea on exertion.  GASTROINTESTINAL: As noted in the History of Present Illness.   GENITOURINARY: No problems with urination. Denies any hematuria or dysuria.  NEUROLOGIC: No dizziness or vertigo, denies headaches.   MUSCULOSKELETAL: Denies any muscle or joint pain.   SKIN: Denies skin rashes or itching.   ENDOCRINE: Denies excessive thirst. Denies intolerance to heat or cold.  PSYCHOSOCIAL: Denies depression or anxiety. Denies any recent memory loss.       Historical Information   History reviewed. No pertinent past medical history.  Past Surgical History:   Procedure Laterality Date    ANKLE FRACTURE SURGERY Right 2002    fracture - no surgery     Social History   Social History     Substance and Sexual Activity   Alcohol Use Yes    Comment: socially     Social History     Substance and Sexual Activity   Drug Use Not Currently     Social History     Tobacco Use   Smoking Status Never   Smokeless Tobacco Never     Family History   Problem Relation Age of Onset    Heart disease Mother     Hypertension Mother     Lung cancer Father     Stomach cancer Father     No Known Problems Sister     No Known Problems Maternal Grandmother     No Known Problems Maternal Grandfather     No Known Problems Paternal Grandmother     No Known Problems Paternal Grandfather     No Known Problems Sister     No Known Problems Sister     No Known Problems Maternal Aunt        Meds/Allergies       Current Outpatient Medications:     Azelastine HCl 137 MCG/SPRAY SOLN    AZELASTINE HCL NA    fexofenadine (ALLEGRA) 60 MG tablet    Allergies   Allergen Reactions    Penicillins Rash           Objective     Blood pressure 140/85, pulse 74, temperature (!) 96.9 °F (36.1 °C),  "temperature source Temporal, height 5' 5\" (1.651 m), weight 102 kg (225 lb), SpO2 100%. Body mass index is 37.44 kg/m².        PHYSICAL EXAM:      General Appearance:   Alert, cooperative, no distress   HEENT:   Normocephalic, atraumatic, anicteric.     Neck:  Supple, symmetrical, trachea midline   Lungs:   Clear to auscultation bilaterally; no rales, rhonchi or wheezing; respirations unlabored    Heart::   Regular rate and rhythm; no murmur, rub, or gallop.   Abdomen:   Soft, non-tender, non-distended; normal bowel sounds; no masses, no organomegaly    Genitalia:   Deferred    Rectal:   Deferred    Extremities:  No cyanosis, clubbing or edema    Pulses:  2+ and symmetric    Skin:  No jaundice, rashes, or lesions    Lymph nodes:  No palpable cervical lymphadenopathy        Lab Results:   No visits with results within 1 Day(s) from this visit.   Latest known visit with results is:   No results found for any previous visit.         Radiology Results:   No results found.  Answers submitted by the patient for this visit:  Abdominal Pain Questionnaire (Submitted on 4/27/2024)  Chief Complaint: Abdominal pain  Chronicity: recurrent  Onset: more than 1 year ago  Onset quality: gradual  Frequency: intermittently  Episode duration: 0.45 Hours  Progression since onset: unchanged  Pain location: epigastric region  Pain - numeric: 8/10  Pain quality: sharp  Radiates to: back, pelvis  anorexia: No  arthralgias: No  belching: Yes  constipation: Yes  diarrhea: Yes  dysuria: No  fever: No  flatus: Yes  frequency: No  headaches: No  hematochezia: No  hematuria: No  melena: No  myalgias: Yes  nausea: No  weight loss: No  vomiting: No  Aggravated by: nothing  Relieved by: bowel movements    "

## 2024-05-10 ENCOUNTER — TREATMENT (OUTPATIENT)
Dept: GASTROENTEROLOGY | Facility: CLINIC | Age: 64
End: 2024-05-10

## 2024-05-10 DIAGNOSIS — R10.10 PAIN OF UPPER ABDOMEN: ICD-10-CM

## 2024-05-10 DIAGNOSIS — K80.20 GALLSTONES: Primary | ICD-10-CM

## 2024-05-13 ENCOUNTER — OFFICE VISIT (OUTPATIENT)
Dept: SURGERY | Facility: CLINIC | Age: 64
End: 2024-05-13
Payer: COMMERCIAL

## 2024-05-13 ENCOUNTER — PREP FOR PROCEDURE (OUTPATIENT)
Age: 64
End: 2024-05-13

## 2024-05-13 ENCOUNTER — TELEPHONE (OUTPATIENT)
Age: 64
End: 2024-05-13

## 2024-05-13 VITALS
BODY MASS INDEX: 37.77 KG/M2 | TEMPERATURE: 98.7 F | DIASTOLIC BLOOD PRESSURE: 80 MMHG | SYSTOLIC BLOOD PRESSURE: 116 MMHG | OXYGEN SATURATION: 100 % | WEIGHT: 227 LBS | RESPIRATION RATE: 18 BRPM | HEART RATE: 76 BPM

## 2024-05-13 DIAGNOSIS — K80.20 GALLSTONES: Primary | ICD-10-CM

## 2024-05-13 DIAGNOSIS — R10.10 PAIN OF UPPER ABDOMEN: ICD-10-CM

## 2024-05-13 DIAGNOSIS — R10.13 EPIGASTRIC PAIN: Primary | ICD-10-CM

## 2024-05-13 PROCEDURE — 99203 OFFICE O/P NEW LOW 30 MIN: CPT | Performed by: SURGERY

## 2024-05-13 NOTE — TELEPHONE ENCOUNTER
Scheduled date of EGD(as of today): 5/28/2024  Physician performing EGD: DR BENAVIDEZ  Location of EGD: [place]   Instructions reviewed with patient by: Isabel via telephone. Procedure directions sent via [facility].  Clearances: n/a

## 2024-05-13 NOTE — PROGRESS NOTES
Consult - General Surgery   Celestina Ash 63 y.o. female MRN: [id number]  Encounter: [id number]    Assessment/Plan     63F with episodic epigastric pain and dyspepsia, found to have gallstones and sludge on outpatient US, symptoms likely secondary to biliary colic versus gastritis or other upper GI etiology. No significant GERD or heartburn symptoms or history. She does have dyspepsia and gassiness from above and below that have been longstanding. We discussed the diagnosis of biliary colic and the definitive management by elective laparoscopic cholecystectomy. We are both in agreement that we should proceed with an EGD prior to making a final decision on moving forward with surgical scheduling to rule out any other UGI causes of her pain and gassiness, for example H.Pylori/ulcer disease/gastritis.     We reviewed the typical perioperative course, risks, and benefits and all questions were answered regarding laparoscopic cholecystectomy. The patient's mother and sister both had the procedure performed so she is familiar.    [facility], at this point I have just read the report. We will then follow up on her EGD report and biopsies and reach out to her by phone to finalize our management plan. She will maintain a low fat diet in the interim. Should we decide to proceed with surgical management, will plan to finalize our informed consent on the day of the operation.    History of Present Illness   Chief Complaint   Patient presents with    Abdominal Pain     Patient is coming into the office for Gallstones , she states that she is having some abdominal pain and no fever/chills.     3-4 episodes of epigastric pain that have lasted 30 minutes each and self resolved without nausea/vomiting/heartburn/reflux. No fevers. Does not feel the pain to the right or to the left. Some chills during the episode. Feels a bit of discomfort in her back.  Has some loose stools with the  pain episodes. Has noticed some longstanding dyspepsia and increased gas production from above and below, was never tested for anything in terms of dietary sensitivities or bacterial overgrowth. Prior colonoscopy 2 years ago, has diverticulosis and hemorrhoids, told to come back in 5 years. No prior abdominal surgeries. Recent GI visit, per the patient they discussed proceeding with EGD as part of her workup which I think is reasonable as well. Outpatient LFTs were normal. Outside US report shows gallstones and sludge. Images to be requested. Patient would like to be proactive with surgical management of the gallbladder if the EGD is negative. No bleeding, clotting, anesthesia reactions, cardiac or respiratory issues.        Review of Systems   Constitutional:  Positive for chills.   Gastrointestinal:  Positive for abdominal pain and diarrhea.        Gassiness    All other systems reviewed and are negative.      Historical Information   No past medical history on file.  Past Surgical History:   Procedure Laterality Date    ANKLE FRACTURE SURGERY Right 2002    fracture - no surgery     Social History   Social History     Substance and Sexual Activity   Alcohol Use Yes    Comment: socially     Social History     Substance and Sexual Activity   Drug Use Not Currently     Social History     Tobacco Use   Smoking Status Never   Smokeless Tobacco Never     Family History   Problem Relation Age of Onset    Heart disease Mother     Hypertension Mother     Lung cancer Father     Stomach cancer Father     No Known Problems Sister     No Known Problems Maternal Grandmother     No Known Problems Maternal Grandfather     No Known Problems Paternal Grandmother     No Known Problems Paternal Grandfather     No Known Problems Sister     No Known Problems Sister     No Known Problems Maternal Aunt        Meds/Allergies     Current Outpatient Medications:     Azelastine HCl 137 MCG/SPRAY SOLN, 2 SPRAYS INTO EACH NOSTRIL NIGHTLY. USE  IN EACH NOSTRIL AS DIRECTED, Disp: , Rfl:     fexofenadine (ALLEGRA) 60 MG tablet, Take 60 mg by mouth daily, Disp: , Rfl:   Allergies   Allergen Reactions    Penicillins Rash       The following portions of the patient's history were reviewed and updated as appropriate: allergies, current medications, past family history, past medical history, past social history, past surgical history, and problem list.    Objective   Current Vitals:   Blood pressure 116/80, pulse 76, temperature 98.7 °F (37.1 °C), temperature source Temporal, resp. rate 18, weight 103 kg (227 lb), SpO2 100%.    Physical Exam  Vitals reviewed.   Constitutional:       General: She is not in acute distress.     Appearance: She is obese. She is not toxic-appearing.   HENT:      Head: Normocephalic.      Nose: No congestion.      Mouth/Throat:      Mouth: Mucous membranes are moist.   Eyes:      Pupils: Pupils are equal, round, and reactive to light.   Cardiovascular:      Rate and Rhythm: Normal rate.   Pulmonary:      Effort: Pulmonary effort is normal.   Abdominal:      General: Abdomen is flat. There is no distension.      Palpations: Abdomen is soft. There is no mass.      Tenderness: There is no abdominal tenderness. There is no guarding or rebound.   Musculoskeletal:         General: Normal range of motion.      Cervical back: Normal range of motion.   Skin:     General: Skin is warm.      Capillary Refill: Capillary refill takes less than 2 seconds.      Coloration: Skin is not jaundiced.   Neurological:      General: No focal deficit present.      Mental Status: She is alert.   Psychiatric:         Mood and Affect: Mood normal.         Signature:  Matt Farmer MD  Date: 5/13/2024 Time: 8:41 AM

## 2024-05-16 ENCOUNTER — TELEPHONE (OUTPATIENT)
Dept: GASTROENTEROLOGY | Facility: CLINIC | Age: 64
End: 2024-05-16

## 2024-05-22 ENCOUNTER — TELEPHONE (OUTPATIENT)
Age: 64
End: 2024-05-22

## 2024-05-22 NOTE — TELEPHONE ENCOUNTER
Called and spoke to patient. MA who took call routed call to wrong office it was for gen surg. Gave patient gen surg phone # regarding gall bladder removal. [phone]. Patient voiced understanding and had no further questions or cocnerns

## 2024-05-22 NOTE — TELEPHONE ENCOUNTER
Patients GI provider:  Dr. Hodge    Number to return call: [phone]    Reason for call: Pt calling requesting to speak with someone because she has questions regarding gallbladder removal.     Scheduled procedure/appointment date if applicable: Apt/procedure

## 2024-05-28 ENCOUNTER — HOSPITAL ENCOUNTER (OUTPATIENT)
Dept: GASTROENTEROLOGY | Facility: HOSPITAL | Age: 64
Setting detail: OUTPATIENT SURGERY
Discharge: HOME | End: 2024-05-28
Attending: INTERNAL MEDICINE | Admitting: INTERNAL MEDICINE
Payer: COMMERCIAL

## 2024-05-28 ENCOUNTER — ANESTHESIA EVENT (OUTPATIENT)
Dept: GASTROENTEROLOGY | Facility: HOSPITAL | Age: 64
End: 2024-05-28

## 2024-05-28 ENCOUNTER — ANESTHESIA (OUTPATIENT)
Dept: GASTROENTEROLOGY | Facility: HOSPITAL | Age: 64
End: 2024-05-28

## 2024-05-28 VITALS
BODY MASS INDEX: 37.64 KG/M2 | WEIGHT: 220.46 LBS | TEMPERATURE: 97.8 F | SYSTOLIC BLOOD PRESSURE: 114 MMHG | DIASTOLIC BLOOD PRESSURE: 63 MMHG | OXYGEN SATURATION: 99 % | HEIGHT: 64 IN | HEART RATE: 71 BPM | RESPIRATION RATE: 18 BRPM

## 2024-05-28 DIAGNOSIS — R10.13 EPIGASTRIC PAIN: ICD-10-CM

## 2024-05-28 PROCEDURE — 43239 EGD BIOPSY SINGLE/MULTIPLE: CPT | Performed by: INTERNAL MEDICINE

## 2024-05-28 PROCEDURE — 88305 TISSUE EXAM BY PATHOLOGIST: CPT | Performed by: PATHOLOGY

## 2024-05-28 RX ORDER — PROPOFOL 10 MG/ML
INJECTION, EMULSION INTRAVENOUS AS NEEDED
Status: DISCONTINUED | OUTPATIENT
Start: 2024-05-28 | End: 2024-05-28

## 2024-05-28 RX ORDER — SODIUM CHLORIDE, SODIUM LACTATE, POTASSIUM CHLORIDE, CALCIUM CHLORIDE 600; 310; 30; 20 MG/100ML; MG/100ML; MG/100ML; MG/100ML
50 INJECTION, SOLUTION INTRAVENOUS CONTINUOUS
Status: DISCONTINUED | OUTPATIENT
Start: 2024-05-28 | End: 2024-06-01 | Stop reason: HOSPADM

## 2024-05-28 RX ORDER — ONDANSETRON 2 MG/ML
4 INJECTION INTRAMUSCULAR; INTRAVENOUS ONCE AS NEEDED
Status: CANCELLED | OUTPATIENT
Start: 2024-05-28

## 2024-05-28 RX ORDER — SODIUM CHLORIDE, SODIUM LACTATE, POTASSIUM CHLORIDE, CALCIUM CHLORIDE 600; 310; 30; 20 MG/100ML; MG/100ML; MG/100ML; MG/100ML
INJECTION, SOLUTION INTRAVENOUS CONTINUOUS PRN
Status: DISCONTINUED | OUTPATIENT
Start: 2024-05-28 | End: 2024-05-28

## 2024-05-28 RX ORDER — LIDOCAINE HYDROCHLORIDE 20 MG/ML
INJECTION, SOLUTION EPIDURAL; INFILTRATION; INTRACAUDAL; PERINEURAL AS NEEDED
Status: DISCONTINUED | OUTPATIENT
Start: 2024-05-28 | End: 2024-05-28

## 2024-05-28 RX ADMIN — LIDOCAINE HYDROCHLORIDE 100 MG: 20 INJECTION, SOLUTION EPIDURAL; INFILTRATION; INTRACAUDAL; PERINEURAL at 10:33

## 2024-05-28 RX ADMIN — PROPOFOL 120 MG: 10 INJECTION, EMULSION INTRAVENOUS at 10:33

## 2024-05-28 RX ADMIN — SODIUM CHLORIDE, SODIUM LACTATE, POTASSIUM CHLORIDE, AND CALCIUM CHLORIDE: .6; .31; .03; .02 INJECTION, SOLUTION INTRAVENOUS at 10:33

## 2024-05-28 RX ADMIN — SODIUM CHLORIDE, SODIUM LACTATE, POTASSIUM CHLORIDE, AND CALCIUM CHLORIDE 50 ML/HR: .6; .31; .03; .02 INJECTION, SOLUTION INTRAVENOUS at 09:43

## 2024-05-28 NOTE — H&P
"History and Physical -  [facility]  Celestina Ash 63 y.o. female MRN: [id number]      HPI: Celestina Ash is a 63 y.o. year old female who presents for evaluation of epigastric abdominal pain      REVIEW OF SYSTEMS: Per the HPI, and otherwise unremarkable.    Historical Information   History reviewed. No pertinent past medical history.  Past Surgical History:   Procedure Laterality Date    ANKLE FRACTURE SURGERY Right 2002    fracture - no surgery     Social History   Social History     Substance and Sexual Activity   Alcohol Use Yes    Comment: socially     Social History     Substance and Sexual Activity   Drug Use Not Currently     Social History     Tobacco Use   Smoking Status Never   Smokeless Tobacco Never     Family History   Problem Relation Age of Onset    Heart disease Mother     Hypertension Mother     Lung cancer Father     Stomach cancer Father     No Known Problems Sister     No Known Problems Maternal Grandmother     No Known Problems Maternal Grandfather     No Known Problems Paternal Grandmother     No Known Problems Paternal Grandfather     No Known Problems Sister     No Known Problems Sister     No Known Problems Maternal Aunt        Meds/Allergies     Not in a hospital admission.    Allergies   Allergen Reactions    Penicillins Rash       Objective     Blood pressure 139/65, pulse 74, temperature 97.7 °F (36.5 °C), temperature source Temporal, resp. rate 20, height 5' 4\" (1.626 m), weight 100 kg (220 lb 7.4 oz), SpO2 100%.      PHYSICAL EXAM    Gen: NAD  CV: RRR  CHEST: Clear  ABD: soft, NT/ND  EXT: no edema      ASSESSMENT/PLAN:  This is a 63 y.o. year old female here for EGD, and she is stable and optimized for her procedure.          "

## 2024-05-28 NOTE — ANESTHESIA POSTPROCEDURE EVALUATION
Post-Op Assessment Note    CV Status:  Stable    Pain management: adequate       Mental Status:  Alert and awake   Hydration Status:  Euvolemic   PONV Controlled:  Controlled   Airway Patency:  Patent     Post Op Vitals Reviewed: Yes    No anethesia notable event occurred.    Staff: BETSY               /66 (05/28/24 1043)    Temp 97.8 °F (36.6 °C) (05/28/24 1043)    Pulse 76 (05/28/24 1043)   Resp 18 (05/28/24 1043)    SpO2 96 % (05/28/24 1043)

## 2024-05-28 NOTE — ANESTHESIA PREPROCEDURE EVALUATION
Procedure:  EGD    Relevant Problems   ANESTHESIA (within normal limits)      CARDIO (within normal limits)      MUSCULOSKELETAL   (+) Primary localized osteoarthritis of knee      PULMONARY (within normal limits)        Physical Exam    Airway    Mallampati score: II  TM Distance: >3 FB  Neck ROM: full     Dental   No notable dental hx     Cardiovascular  Cardiovascular exam normal    Pulmonary  Pulmonary exam normal     Other Findings  post-pubertal.      Anesthesia Plan  ASA Score- 2     Anesthesia Type- IV sedation with anesthesia with ASA Monitors.         Additional Monitors:     Airway Plan:            Plan Factors-Exercise tolerance (METS): >4 METS.    Chart reviewed.    Patient summary reviewed.    Patient is not a current smoker.              Induction-     Postoperative Plan-         Informed Consent- Anesthetic plan and risks discussed with patient.  I personally reviewed this patient with the CRNA. Discussed and agreed on the Anesthesia Plan with the CRNA..

## 2024-05-29 PROCEDURE — 88305 TISSUE EXAM BY PATHOLOGIST: CPT | Performed by: PATHOLOGY

## 2024-05-30 ENCOUNTER — TELEPHONE (OUTPATIENT)
Dept: SURGERY | Facility: CLINIC | Age: 64
End: 2024-05-30

## 2024-05-30 NOTE — TELEPHONE ENCOUNTER
Reviewed US images, EGD report, images, and pathology, discussed with patient's GI physician. We would like to move forward with elective gallbladder surgery if the patient is still having symptoms and would like to proceed. Preop labs, EKG ordered if you can inform patient to obtain these please. Booking orders placed. Needs a booking sheet Meghna if you can fill one out please. Only thing needed is labs and EKG, consent to be completed day of. Please confirm she has soap. Let me know if anything else needed, thanks.

## 2024-06-04 NOTE — TELEPHONE ENCOUNTER
Patient is added on for 6/24/2024.  Testing was completed, patient has the soap and instructions were gone over.  No further questions.

## 2024-06-07 ENCOUNTER — TELEPHONE (OUTPATIENT)
Dept: GASTROENTEROLOGY | Facility: CLINIC | Age: 64
End: 2024-06-07

## 2024-06-13 ENCOUNTER — ANESTHESIA EVENT (OUTPATIENT)
Dept: PERIOP | Facility: HOSPITAL | Age: 64
End: 2024-06-13
Payer: COMMERCIAL

## 2024-06-13 RX ORDER — CLINDAMYCIN HYDROCHLORIDE 300 MG/1
300 CAPSULE ORAL 4 TIMES DAILY
COMMUNITY

## 2024-06-13 NOTE — PRE-PROCEDURE INSTRUCTIONS
Pre-Surgery Instructions:   Medication Instructions    Azelastine HCl 137 MCG/SPRAY SOLN Uses PRN- OK to take day of surgery    clindamycin (CLEOCIN) 300 MG capsule Finished prior to sx    fexofenadine (ALLEGRA) 60 MG tablet Uses PRN- OK to take day of surgery    Medication instructions for day surgery reviewed. Please use only a sip of water to take your instructed medications. Avoid all over the counter vitamins, supplements and NSAIDS for one week prior to surgery per anesthesia guidelines. Tylenol is ok to take as needed.     You will receive a call one business day prior to surgery with an arrival time and hospital directions. If your surgery is scheduled on a Monday, the hospital will be calling you on the Friday prior to your surgery. If you have not heard from anyone by 8pm, please call the hospital supervisor through the hospital  at [phone]. ([place] [phone] or [place] [phone]).    Do not eat or drink anything after midnight the night before your surgery, including candy, mints, lifesavers, or chewing gum. Do not drink alcohol 24hrs before your surgery. Try not to smoke at least 24hrs before your surgery.       Follow the pre surgery showering instructions as listed in the “My Surgical Experience Booklet” or otherwise provided by your surgeon's office. Do not use a blade to shave the surgical area 1 week before surgery. It is okay to use a clean electric clippers up to 24 hours before surgery. Do not apply any lotions, creams, including makeup, cologne, deodorant, or perfumes after showering on the day of your surgery. Do not use dry shampoo, hair spray, hair gel, or any type of hair products.     No contact lenses, eye make-up, or artificial eyelashes. Remove nail polish, including gel polish, and any artificial, gel, or acrylic nails if possible. Remove all jewelry including rings and body piercing jewelry.     Wear causal clothing that is easy to take on and off. Consider your  type of surgery.    Keep any valuables, jewelry, piercings at home. Please bring any specially ordered equipment (sling, braces) if indicated.    Arrange for a responsible person to drive you to and from the hospital on the day of your surgery. Please confirm the visitor policy for the day of your procedure when you receive your phone call with an arrival time.     Call the surgeon's office with any new illnesses, exposures, or additional questions prior to surgery.    Please reference your “My Surgical Experience Booklet” for additional information to prepare for your upcoming surgery.

## 2024-06-23 NOTE — ANESTHESIA PREPROCEDURE EVALUATION
Procedure:  CHOLECYSTECTOMY LAPAROSCOPIC, POSSIBLE OPEN, POSSIBLE CHOLANGIOGRAM (Abdomen)    Relevant Problems   ANESTHESIA (within normal limits)      CARDIO (within normal limits)      GI/HEPATIC (within normal limits)  NPO confirmed  BMI 37.8      /RENAL (within normal limits)      HEMATOLOGY (within normal limits)      MUSCULOSKELETAL   (+) Primary localized osteoarthritis of knee      NEURO/PSYCH   (-) CVA (cerebral vascular accident) (HCC)   (-) Seizures (HCC)      PULMONARY (within normal limits)   (-) Sleep apnea   (-) URI (upper respiratory infection)      Allergies   Allergen Reactions    Penicillins Rash     Social History     Tobacco Use    Smoking status: Never    Smokeless tobacco: Never   Vaping Use    Vaping status: Never Used   Substance Use Topics    Alcohol use: Yes     Comment: socially    Drug use: Not Currently     Current Outpatient Medications   Medication Instructions    Azelastine HCl 137 MCG/SPRAY SOLN 2 SPRAYS INTO EACH NOSTRIL NIGHTLY. USE IN EACH NOSTRIL AS DIRECTED    clindamycin (CLEOCIN) 300 mg, Oral, 4 times daily    fexofenadine (ALLEGRA) 60 mg, Oral, Daily     Lab Results   Component Value Date    SODIUM 139 06/04/2024    K 4.8 06/04/2024     06/04/2024    CO2 29 06/04/2024    BUN 15 06/04/2024    CREATININE 0.85 06/04/2024    GLUC 89 06/04/2024    AST 34 06/04/2024    ALT 72 (H) 06/04/2024    ALKPHOS 131 (H) 06/04/2024    TBILI 0.4 06/04/2024    ALB 4.4 06/04/2024     Vitals:    06/24/24 0641   BP: 139/74   Pulse: 68   Resp: 18   Temp: (!) 97.3 °F (36.3 °C)   SpO2: 100%       Physical Exam    Airway    Mallampati score: II  TM Distance: >3 FB  Neck ROM: full     Dental   No notable dental hx     Cardiovascular  Cardiovascular exam normal    Pulmonary  Pulmonary exam normal     Other Findings  post-pubertal.      Anesthesia Plan  ASA Score- 2     Anesthesia Type- general with ASA Monitors.         Additional Monitors:     Airway Plan: ETT.           Plan Factors-Exercise  tolerance (METS): >4 METS.    Chart reviewed.    Patient summary reviewed.    Patient is not a current smoker.              Induction- intravenous.    Postoperative Plan- Plan for postoperative opioid use.     Perioperative Resuscitation Plan - Level 1 - Full Code.       Informed Consent- Anesthetic plan and risks discussed with patient.  I personally reviewed this patient with the CRNA. Discussed and agreed on the Anesthesia Plan with the CRNA..

## 2024-06-24 ENCOUNTER — HOSPITAL ENCOUNTER (OUTPATIENT)
Facility: HOSPITAL | Age: 64
Setting detail: OUTPATIENT SURGERY
Discharge: HOME | End: 2024-06-24
Attending: SURGERY | Admitting: SURGERY
Payer: COMMERCIAL

## 2024-06-24 ENCOUNTER — ANESTHESIA (OUTPATIENT)
Dept: PERIOP | Facility: HOSPITAL | Age: 64
End: 2024-06-24
Payer: COMMERCIAL

## 2024-06-24 VITALS
HEART RATE: 66 BPM | BODY MASS INDEX: 37.56 KG/M2 | SYSTOLIC BLOOD PRESSURE: 114 MMHG | DIASTOLIC BLOOD PRESSURE: 56 MMHG | HEIGHT: 64 IN | RESPIRATION RATE: 17 BRPM | WEIGHT: 220 LBS | OXYGEN SATURATION: 99 % | TEMPERATURE: 97.3 F

## 2024-06-24 DIAGNOSIS — R10.10 PAIN OF UPPER ABDOMEN: ICD-10-CM

## 2024-06-24 DIAGNOSIS — K80.50 RECURRENT BILIARY COLIC: ICD-10-CM

## 2024-06-24 DIAGNOSIS — K80.20 GALLSTONES: ICD-10-CM

## 2024-06-24 PROCEDURE — 47562 LAPAROSCOPIC CHOLECYSTECTOMY: CPT

## 2024-06-24 PROCEDURE — 88304 TISSUE EXAM BY PATHOLOGIST: CPT | Performed by: PATHOLOGY

## 2024-06-24 PROCEDURE — NC001 PR NO CHARGE: Performed by: SURGERY

## 2024-06-24 PROCEDURE — 47562 LAPAROSCOPIC CHOLECYSTECTOMY: CPT | Performed by: SURGERY

## 2024-06-24 RX ORDER — LIDOCAINE HYDROCHLORIDE 10 MG/ML
INJECTION, SOLUTION EPIDURAL; INFILTRATION; INTRACAUDAL; PERINEURAL AS NEEDED
Status: DISCONTINUED | OUTPATIENT
Start: 2024-06-24 | End: 2024-06-24

## 2024-06-24 RX ORDER — HEPARIN SODIUM 5000 [USP'U]/ML
5000 INJECTION, SOLUTION INTRAVENOUS; SUBCUTANEOUS ONCE
Status: COMPLETED | OUTPATIENT
Start: 2024-06-24 | End: 2024-06-24

## 2024-06-24 RX ORDER — ONDANSETRON 2 MG/ML
INJECTION INTRAMUSCULAR; INTRAVENOUS AS NEEDED
Status: DISCONTINUED | OUTPATIENT
Start: 2024-06-24 | End: 2024-06-24

## 2024-06-24 RX ORDER — HYDROMORPHONE HCL/PF 1 MG/ML
0.5 SYRINGE (ML) INJECTION
Status: DISCONTINUED | OUTPATIENT
Start: 2024-06-24 | End: 2024-06-24 | Stop reason: HOSPADM

## 2024-06-24 RX ORDER — ONDANSETRON 2 MG/ML
4 INJECTION INTRAMUSCULAR; INTRAVENOUS ONCE AS NEEDED
Status: DISCONTINUED | OUTPATIENT
Start: 2024-06-24 | End: 2024-06-24 | Stop reason: HOSPADM

## 2024-06-24 RX ORDER — BUPIVACAINE HYDROCHLORIDE 5 MG/ML
INJECTION, SOLUTION EPIDURAL; INTRACAUDAL AS NEEDED
Status: DISCONTINUED | OUTPATIENT
Start: 2024-06-24 | End: 2024-06-24 | Stop reason: HOSPADM

## 2024-06-24 RX ORDER — FENTANYL CITRATE/PF 50 MCG/ML
50 SYRINGE (ML) INJECTION
Status: DISCONTINUED | OUTPATIENT
Start: 2024-06-24 | End: 2024-06-24 | Stop reason: HOSPADM

## 2024-06-24 RX ORDER — ROCURONIUM BROMIDE 10 MG/ML
INJECTION, SOLUTION INTRAVENOUS AS NEEDED
Status: DISCONTINUED | OUTPATIENT
Start: 2024-06-24 | End: 2024-06-24

## 2024-06-24 RX ORDER — TRAMADOL HYDROCHLORIDE 50 MG/1
50 TABLET ORAL EVERY 6 HOURS PRN
Status: DISCONTINUED | OUTPATIENT
Start: 2024-06-24 | End: 2024-06-24 | Stop reason: HOSPADM

## 2024-06-24 RX ORDER — DEXAMETHASONE SODIUM PHOSPHATE 10 MG/ML
INJECTION, SOLUTION INTRAMUSCULAR; INTRAVENOUS AS NEEDED
Status: DISCONTINUED | OUTPATIENT
Start: 2024-06-24 | End: 2024-06-24

## 2024-06-24 RX ORDER — CEFAZOLIN SODIUM 2 G/50ML
2000 SOLUTION INTRAVENOUS ONCE
Status: COMPLETED | OUTPATIENT
Start: 2024-06-24 | End: 2024-06-24

## 2024-06-24 RX ORDER — MIDAZOLAM HYDROCHLORIDE 2 MG/2ML
INJECTION, SOLUTION INTRAMUSCULAR; INTRAVENOUS AS NEEDED
Status: DISCONTINUED | OUTPATIENT
Start: 2024-06-24 | End: 2024-06-24

## 2024-06-24 RX ORDER — SODIUM CHLORIDE, SODIUM LACTATE, POTASSIUM CHLORIDE, CALCIUM CHLORIDE 600; 310; 30; 20 MG/100ML; MG/100ML; MG/100ML; MG/100ML
125 INJECTION, SOLUTION INTRAVENOUS CONTINUOUS
Status: DISCONTINUED | OUTPATIENT
Start: 2024-06-24 | End: 2024-06-24 | Stop reason: HOSPADM

## 2024-06-24 RX ORDER — HYDROMORPHONE HCL/PF 1 MG/ML
SYRINGE (ML) INJECTION AS NEEDED
Status: DISCONTINUED | OUTPATIENT
Start: 2024-06-24 | End: 2024-06-24

## 2024-06-24 RX ORDER — FENTANYL CITRATE 50 UG/ML
INJECTION, SOLUTION INTRAMUSCULAR; INTRAVENOUS AS NEEDED
Status: DISCONTINUED | OUTPATIENT
Start: 2024-06-24 | End: 2024-06-24

## 2024-06-24 RX ORDER — TRAMADOL HYDROCHLORIDE 50 MG/1
50 TABLET ORAL EVERY 6 HOURS PRN
Qty: 15 TABLET | Refills: 0 | Status: SHIPPED | OUTPATIENT
Start: 2024-06-24 | End: 2024-07-04

## 2024-06-24 RX ORDER — PROPOFOL 10 MG/ML
INJECTION, EMULSION INTRAVENOUS AS NEEDED
Status: DISCONTINUED | OUTPATIENT
Start: 2024-06-24 | End: 2024-06-24

## 2024-06-24 RX ORDER — ACETAMINOPHEN 325 MG/1
650 TABLET ORAL EVERY 6 HOURS PRN
Status: DISCONTINUED | OUTPATIENT
Start: 2024-06-24 | End: 2024-06-24 | Stop reason: HOSPADM

## 2024-06-24 RX ORDER — ONDANSETRON 2 MG/ML
4 INJECTION INTRAMUSCULAR; INTRAVENOUS EVERY 6 HOURS PRN
Status: DISCONTINUED | OUTPATIENT
Start: 2024-06-24 | End: 2024-06-24 | Stop reason: HOSPADM

## 2024-06-24 RX ORDER — MAGNESIUM HYDROXIDE 1200 MG/15ML
LIQUID ORAL AS NEEDED
Status: DISCONTINUED | OUTPATIENT
Start: 2024-06-24 | End: 2024-06-24 | Stop reason: HOSPADM

## 2024-06-24 RX ORDER — METOCLOPRAMIDE HYDROCHLORIDE 5 MG/ML
10 INJECTION INTRAMUSCULAR; INTRAVENOUS ONCE AS NEEDED
Status: DISCONTINUED | OUTPATIENT
Start: 2024-06-24 | End: 2024-06-24 | Stop reason: HOSPADM

## 2024-06-24 RX ADMIN — TRAMADOL HYDROCHLORIDE 50 MG: 50 TABLET, COATED ORAL at 10:30

## 2024-06-24 RX ADMIN — FENTANYL CITRATE 50 MCG: 50 INJECTION, SOLUTION INTRAMUSCULAR; INTRAVENOUS at 07:54

## 2024-06-24 RX ADMIN — PROPOFOL 160 MG: 10 INJECTION, EMULSION INTRAVENOUS at 07:34

## 2024-06-24 RX ADMIN — FENTANYL CITRATE 50 MCG: 50 INJECTION INTRAMUSCULAR; INTRAVENOUS at 09:33

## 2024-06-24 RX ADMIN — HYDROMORPHONE HYDROCHLORIDE 0.4 MG: 1 INJECTION, SOLUTION INTRAMUSCULAR; INTRAVENOUS; SUBCUTANEOUS at 08:00

## 2024-06-24 RX ADMIN — MIDAZOLAM 2 MG: 1 INJECTION INTRAMUSCULAR; INTRAVENOUS at 07:28

## 2024-06-24 RX ADMIN — SODIUM CHLORIDE, SODIUM LACTATE, POTASSIUM CHLORIDE, AND CALCIUM CHLORIDE 125 ML/HR: .6; .31; .03; .02 INJECTION, SOLUTION INTRAVENOUS at 06:56

## 2024-06-24 RX ADMIN — HEPARIN SODIUM 5000 UNITS: 5000 INJECTION, SOLUTION INTRAVENOUS; SUBCUTANEOUS at 06:57

## 2024-06-24 RX ADMIN — LIDOCAINE HYDROCHLORIDE 50 MG: 10 INJECTION, SOLUTION EPIDURAL; INFILTRATION; INTRACAUDAL; PERINEURAL at 07:33

## 2024-06-24 RX ADMIN — ONDANSETRON 4 MG: 2 INJECTION INTRAMUSCULAR; INTRAVENOUS at 08:45

## 2024-06-24 RX ADMIN — FENTANYL CITRATE 50 MCG: 50 INJECTION, SOLUTION INTRAMUSCULAR; INTRAVENOUS at 07:32

## 2024-06-24 RX ADMIN — SUGAMMADEX 200 MG: 100 INJECTION, SOLUTION INTRAVENOUS at 08:52

## 2024-06-24 RX ADMIN — FENTANYL CITRATE 50 MCG: 50 INJECTION INTRAMUSCULAR; INTRAVENOUS at 09:42

## 2024-06-24 RX ADMIN — ROCURONIUM BROMIDE 50 MG: 10 INJECTION, SOLUTION INTRAVENOUS at 07:34

## 2024-06-24 RX ADMIN — DEXAMETHASONE SODIUM PHOSPHATE 10 MG: 10 INJECTION, SOLUTION INTRAMUSCULAR; INTRAVENOUS at 07:37

## 2024-06-24 RX ADMIN — SODIUM CHLORIDE, SODIUM LACTATE, POTASSIUM CHLORIDE, AND CALCIUM CHLORIDE: .6; .31; .03; .02 INJECTION, SOLUTION INTRAVENOUS at 08:52

## 2024-06-24 RX ADMIN — CEFAZOLIN SODIUM 2000 MG: 2 SOLUTION INTRAVENOUS at 07:28

## 2024-06-24 NOTE — H&P
US with gallstones. EGD negative. Two additional episodes of abdominal pain since seen in the office. Last episode 3 weeks ago. No prior abdominal surgeries. Informed consent obtained, laparoscopic, possible open cholecystectomy, possible cholangiogram. Proceed to OR today.    Vitals:    06/24/24 0641   BP: 139/74   Pulse: 68   Resp: 18   Temp: (!) 97.3 °F (36.3 °C)   SpO2: 100%        H and P- General Surgery   Celestina Ash 63 y.o. female MRN: [id number]  Encounter: [id number]     Assessment/Plan      63F with episodic epigastric pain and dyspepsia, found to have gallstones and sludge on outpatient US, symptoms likely secondary to biliary colic versus gastritis or other upper GI etiology. No significant GERD or heartburn symptoms or history. She does have dyspepsia and gassiness from above and below that have been longstanding. We discussed the diagnosis of biliary colic and the definitive management by elective laparoscopic cholecystectomy. We are both in agreement that we should proceed with an EGD prior to making a final decision on moving forward with surgical scheduling to rule out any other UGI causes of her pain and gassiness, for example H.Pylori/ulcer disease/gastritis.      We reviewed the typical perioperative course, risks, and benefits and all questions were answered regarding laparoscopic cholecystectomy. The patient's mother and sister both had the procedure performed so she is familiar.     Our plan will be to request her [facility] US images for my review, at this point I have just read the report. We will then follow up on her EGD report and biopsies and reach out to her by phone to finalize our management plan. She will maintain a low fat diet in the interim. Should we decide to proceed with surgical management, will plan to finalize our informed consent on the day of the operation.     History of Present Illness              Chief Complaint   Patient presents with    Abdominal Pain       Patient is  coming into the office for Gallstones , she states that she is having some abdominal pain and no fever/chills.      3-4 episodes of epigastric pain that have lasted 30 minutes each and self resolved without nausea/vomiting/heartburn/reflux. No fevers. Does not feel the pain to the right or to the left. Some chills during the episode. Feels a bit of discomfort in her back.  Has some loose stools with the pain episodes. Has noticed some longstanding dyspepsia and increased gas production from above and below, was never tested for anything in terms of dietary sensitivities or bacterial overgrowth. Prior colonoscopy 2 years ago, has diverticulosis and hemorrhoids, told to come back in 5 years. No prior abdominal surgeries. Recent GI visit, per the patient they discussed proceeding with EGD as part of her workup which I think is reasonable as well. Outpatient LFTs were normal. Outside US report shows gallstones and sludge. Images to be requested. Patient would like to be proactive with surgical management of the gallbladder if the EGD is negative. No bleeding, clotting, anesthesia reactions, cardiac or respiratory issues.           Review of Systems   Constitutional:  Positive for chills.   Gastrointestinal:  Positive for abdominal pain and diarrhea.        Gassiness    All other systems reviewed and are negative.        Historical Information         Medical History   No past medical history on file.     Surgical History         Past Surgical History:   Procedure Laterality Date    ANKLE FRACTURE SURGERY Right 2002     fracture - no surgery         Social History         Social History           Substance and Sexual Activity   Alcohol Use Yes     Comment: socially      Social History          Substance and Sexual Activity   Drug Use Not Currently      Tobacco Use History   Social History          Tobacco Use   Smoking Status Never   Smokeless Tobacco Never         Family History         Family History   Problem Relation  Age of Onset    Heart disease Mother      Hypertension Mother      Lung cancer Father      Stomach cancer Father      No Known Problems Sister      No Known Problems Maternal Grandmother      No Known Problems Maternal Grandfather      No Known Problems Paternal Grandmother      No Known Problems Paternal Grandfather      No Known Problems Sister      No Known Problems Sister      No Known Problems Maternal Aunt              Meds/Allergies     Current Medications      Current Outpatient Medications:     Azelastine HCl 137 MCG/SPRAY SOLN, 2 SPRAYS INTO EACH NOSTRIL NIGHTLY. USE IN EACH NOSTRIL AS DIRECTED, Disp: , Rfl:     fexofenadine (ALLEGRA) 60 MG tablet, Take 60 mg by mouth daily, Disp: , Rfl:      Allergies        Allergies   Allergen Reactions    Penicillins Rash            The following portions of the patient's history were reviewed and updated as appropriate: allergies, current medications, past family history, past medical history, past social history, past surgical history, and problem list.     Objective   Current Vitals:   Vitals:    06/24/24 0641   BP: 139/74   Pulse: 68   Resp: 18   Temp: (!) 97.3 °F (36.3 °C)   SpO2: 100%         Physical Exam  Vitals reviewed.   Constitutional:       General: She is not in acute distress.     Appearance: She is obese. She is not toxic-appearing.   HENT:      Head: Normocephalic.      Nose: No congestion.      Mouth/Throat:      Mouth: Mucous membranes are moist.   Eyes:      Pupils: Pupils are equal, round, and reactive to light.   Cardiovascular:      Rate and Rhythm: Normal rate.   Pulmonary:      Effort: Pulmonary effort is normal.   Abdominal:      General: Abdomen is flat. There is no distension.      Palpations: Abdomen is soft. There is no mass.      Tenderness: There is no abdominal tenderness. There is no guarding or rebound.   Musculoskeletal:         General: Normal range of motion.      Cervical back: Normal range of motion.   Skin:     General: Skin is  warm.      Capillary Refill: Capillary refill takes less than 2 seconds.      Coloration: Skin is not jaundiced.   Neurological:      General: No focal deficit present.      Mental Status: She is alert.   Psychiatric:         Mood and Affect: Mood normal.

## 2024-06-24 NOTE — DISCHARGE INSTR - AVS FIRST PAGE
DISCHARGE INSTRUCTIONS:   Light activity   No heavy lifting, limit lifting to 15-20 pounds for 2 weeks   No limitations for diet   Please take medications as prescribed   Please take acetaminophen/ibuprofen scheduled every 4-6 hours for pain  Please take narcotic pain medication as needed for severe pain   Do not drive if taking narcotic pain medication     If surgical glue is over your incisions, this will peel off on its own  Please do not remove glue prematurely   You may shower tomorrow but do not scrub or submerge incisions    Please notify general surgery office if you experience:  Fever over 101.5F  Persistent nausea or vomiting  Severe uncontrolled pain  Redness, tenderness, or signs of infection near incisions (pain, swelling, redness, yellow/green drainage)  Active or persistent bleeding from incisions  Chest pain, shortness of breath     You have a follow up appointment scheduled with Dr Farmer 7/8/24, please keep this appointment   Please call our office with any additional questions or concerns

## 2024-06-24 NOTE — OP NOTE
OPERATIVE REPORT  PATIENT NAME: Celestina Ash    :  1960  MRN: [id number]  Pt Location: [facility] OR ROOM 01    SURGERY DATE: 2024    Surgeons and Role:     * Matt Farmer MD - Primary     * Minnie Sanz PA-C - Assisting    Preop Diagnosis:  Gallstones [K80.20]  Pain of upper abdomen [R10.10]  Recurrent biliary colic [K80.50]    Post-Op Diagnosis Codes:     * Gallstones [K80.20]     * Pain of upper abdomen [R10.10]     * Recurrent biliary colic [K80.50]    Procedure(s):  CHOLECYSTECTOMY LAPAROSCOPIC    Specimen(s):  ID Type Source Tests Collected by Time Destination   1 :  Tissue Gallbladder TISSUE EXAM Matt Farmer MD 2024 [id number]      Estimated Blood Loss:   Minimal    Anesthesia Type:   General  Local    Operative Indications:  Gallstones [K80.20]  Pain of upper abdomen [R10.10]  Recurrent biliary colic [K80.50]  63F with recurrent biliary colic, consented for laparoscopic, possible open cholecystectomy, possible cholangiogram.    Operative Findings:  -Acute and chronic inflammatory changes of the gallbladder, multiple stones, omental adhesions to the liver and gallbladder  -Critical view of safety achieved  -Intrahepatic gallbladder  -Some liver bed oozing, all self limited    Complications:   None    Procedure and Technique:  The patient was taken to the operating room where she was properly identified, monitored and anesthetized. She received antibiotics perioperatively. Venodyne's were placed prior to the induction of anesthesia for DVT prophylaxis. The abdomen prepped and draped under sterile conditions using aseptic technique. Timeout performed. Skin incised at the umbilicus. Dissection carried down to fascia which was elevated between Kocher clamps and sharply incised. Peritoneal cavity entered bluntly with a [place] clamp. 11 mm trocar inserted and pneumoperitoneum established to 15 mmHg. 4 quadrants of the abdomen and inspected laparoscopically and no additional pathology was  identified. 3 additional working ports placed. These were 5 mm ports in the epigastrium and right upper quadrant. The patient placed in reverse Trendelenburg, left side down. Gallbladder grasped at its dome retracted cephalad and to the right. Infundibulum grasped and retracted laterally. [place] defined and skeletonized. Cystic duct dissected out circumferentially. Cystic artery dissected out circumferentially. Both the artery and duct were controlled with clips, two on the stay side one on the specimen side, and divided between clips with parker. An additional small arterial branch was running into the gallbladder adjacent to the duct and this was separately controlled with clips. Gallbladder dissected off the liver with electrocautery. Gallbladder was intrahepatic, some oozing from the liver bed during the dissection.The gallbladder was placed in an Endobag and delivered through the umbilical trocar site. Pneumoperitoneum reestablished to 15 mmHg. Scope advanced and the right upper quadrant inspected. Right upper quadrant irrigated and aspirated until the aspirate was clear. Good hemostasis found. The procedure completed with removal of all ports. The fascial defect at the umbilicus closed with two figure-of-eight sutures of 0 Vicryl. Skin closed with subcuticular 4-0 Monocryl suture. Wounds infiltrated with half percent Marcaine. The wounds dressed. The patient extubated and taken to recovery in stable condition. I was present for the entire procedure and a physician assistant was required during the procedure for retraction, tissue handling, dissection and suturing.     Patient Disposition:  PACU       SIGNATURE: Matt Farmer MD  DATE: June 24, 2024  TIME: 9:00 AM

## 2024-06-24 NOTE — ANESTHESIA POSTPROCEDURE EVALUATION
Post-Op Assessment Note    CV Status:  Stable  Pain Score: 0    Pain management: adequate       Mental Status:  Alert and awake   Hydration Status:  Stable   PONV Controlled:  None   Airway Patency:  Patent and adequate     Post Op Vitals Reviewed: Yes    No anethesia notable event occurred.    Staff: Anesthesiologist, CRNA               BP   128/80   Temp   97.9   Pulse  78   Resp   16   SpO2   98%

## 2024-06-26 ENCOUNTER — TELEPHONE (OUTPATIENT)
Age: 64
End: 2024-06-26

## 2024-06-26 ENCOUNTER — TELEPHONE (OUTPATIENT)
Dept: SURGERY | Facility: CLINIC | Age: 64
End: 2024-06-26

## 2024-06-26 PROCEDURE — 88304 TISSUE EXAM BY PATHOLOGIST: CPT | Performed by: PATHOLOGY

## 2024-06-26 NOTE — TELEPHONE ENCOUNTER
Called & LM for patient to call back    Placed call to patient and left a message to return our call.

## 2024-06-26 NOTE — TELEPHONE ENCOUNTER
----- Message from Matt Farmer MD sent at 6/26/2024  3:56 PM EDT -----  Gallbladder pathology with inflammation and stones, no cancer. Please check in on her recovery so far and confirm her postop visit.

## 2024-06-26 NOTE — RESULT ENCOUNTER NOTE
Gallbladder pathology with inflammation and stones, no cancer. Please check in on her recovery so far and confirm her postop visit.

## 2024-06-27 NOTE — TELEPHONE ENCOUNTER
Patient was in the living room and said come in.   Patient rated his pain as 2/10. See result note.

## 2024-07-08 ENCOUNTER — OFFICE VISIT (OUTPATIENT)
Dept: SURGERY | Facility: CLINIC | Age: 64
End: 2024-07-08

## 2024-07-08 VITALS
BODY MASS INDEX: 37.76 KG/M2 | OXYGEN SATURATION: 98 % | WEIGHT: 220 LBS | SYSTOLIC BLOOD PRESSURE: 112 MMHG | DIASTOLIC BLOOD PRESSURE: 50 MMHG | HEART RATE: 68 BPM | RESPIRATION RATE: 18 BRPM | TEMPERATURE: 96.7 F

## 2024-07-08 DIAGNOSIS — Z90.49 S/P LAPAROSCOPIC CHOLECYSTECTOMY: Primary | ICD-10-CM

## 2024-07-08 PROCEDURE — 99024 POSTOP FOLLOW-UP VISIT: CPT | Performed by: SURGERY

## 2024-07-20 PROBLEM — Z90.49 S/P LAPAROSCOPIC CHOLECYSTECTOMY: Status: ACTIVE | Noted: 2024-07-20

## 2024-07-20 PROBLEM — R10.10 PAIN OF UPPER ABDOMEN: Status: RESOLVED | Noted: 2024-05-13 | Resolved: 2024-07-20

## 2024-07-20 PROBLEM — K80.20 GALLSTONES: Status: RESOLVED | Noted: 2024-05-13 | Resolved: 2024-07-20

## 2024-07-20 NOTE — PROGRESS NOTES
Post-Op Note - General Surgery   Celestina Ash 63 y.o. female MRN: [id number]  Encounter: [id number]    Assessment & Plan     63F 2 weeks status post laparoscopic cholecystectomy for biliary colic and chronic cholecystitis, doing very well, pathology benign. Wounds healing, no signs of infection. No signs of postoperative complication. Discussed what to look out for and when to call. She is overall doing great, and she will reach out on an as needed basis in the future.    Subjective      Chief Complaint   Patient presents with    Post-op     po lap radha with IOC 6/24/2024     Doing well, no pain, no nausea, eating normally, no signs of infection. Pathology benign.       Review of Systems   Constitutional:  Positive for activity change and fatigue. Negative for appetite change, chills and fever.   Gastrointestinal: Negative.    Genitourinary: Negative.    Skin:  Positive for wound. Negative for color change.   All other systems reviewed and are negative.      The following portions of the patient's history were reviewed and updated as appropriate: allergies, current medications, past family history, past medical history, past social history, past surgical history, and problem list.    Objective      Blood pressure 112/50, pulse 68, temperature (!) 96.7 °F (35.9 °C), temperature source Temporal, resp. rate 18, weight 99.8 kg (220 lb), SpO2 98%.   Physical Exam  Vitals reviewed.   Constitutional:       General: She is not in acute distress.     Appearance: She is not toxic-appearing.   HENT:      Head: Normocephalic.      Nose: No congestion.      Mouth/Throat:      Mouth: Mucous membranes are moist.   Eyes:      Pupils: Pupils are equal, round, and reactive to light.   Cardiovascular:      Rate and Rhythm: Normal rate.   Pulmonary:      Effort: Pulmonary effort is normal. No respiratory distress.   Abdominal:      General: Abdomen is flat. There is no distension.      Palpations: Abdomen is soft.      Tenderness:  There is no abdominal tenderness. There is no guarding or rebound.      Comments: Incisions healing well, no signs of infection   Musculoskeletal:         General: Normal range of motion.      Cervical back: Normal range of motion.   Skin:     General: Skin is warm.      Capillary Refill: Capillary refill takes less than 2 seconds.   Neurological:      General: No focal deficit present.      Mental Status: She is alert.   Psychiatric:         Mood and Affect: Mood normal.         Signature:  Matt Farmer MD  Date: 7/20/2024 Time: 5:51 PM

## (undated) DEVICE — SUT VICRYL 0 UR-6 27 IN J603H

## (undated) DEVICE — CLIP APPLIER WITH CLIP LOGIC TECHNOLOGY: Brand: ENDO CLIP III

## (undated) DEVICE — ENDOPOUCH RETRIEVER SPECIMEN RETRIEVAL BAGS: Brand: ENDOPOUCH RETRIEVER

## (undated) DEVICE — 3M™ TEGADERM™ TRANSPARENT FILM DRESSING FRAME STYLE, 1624W, 2-3/8 IN X 2-3/4 IN (6 CM X 7 CM), 100/CT 4CT/CASE: Brand: 3M™ TEGADERM™

## (undated) DEVICE — GLOVE SRG BIOGEL 7

## (undated) DEVICE — GARMENT,MEDLINE,DVT,INT,CALF,FOAM,MED: Brand: MEDLINE

## (undated) DEVICE — PENCIL ROCKER SWITCH CAUTERY HAND CONTROL

## (undated) DEVICE — 2, DISPOSABLE SUCTION/IRRIGATOR WITHOUT DISPOSABLE TIP: Brand: STRYKEFLOW

## (undated) DEVICE — CHLORAPREP HI-LITE 26ML ORANGE

## (undated) DEVICE — GAUZE SPONGES,8 PLY: Brand: CURITY

## (undated) DEVICE — 4-PORT MANIFOLD: Brand: NEPTUNE 2

## (undated) DEVICE — SUT MONOCRYL 4-0 PS-2 27 IN Y426H

## (undated) DEVICE — INTENDED FOR TISSUE SEPARATION, AND OTHER PROCEDURES THAT REQUIRE A SHARP SURGICAL BLADE TO PUNCTURE OR CUT.: Brand: BARD-PARKER ® CARBON RIB-BACK BLADES

## (undated) DEVICE — LAPAROSCOPIC SCISSORS: Brand: EPIX LAPAROSCOPIC SCISSORS

## (undated) DEVICE — GLOVE INDICATOR PI UNDERGLOVE SZ 7 BLUE

## (undated) DEVICE — TUBE SET SMOKE EVAC PNEUMOCLEAR HIGH FLOW

## (undated) DEVICE — ENDOPATH XCEL BLADELESS TROCARS WITH STABILITY SLEEVES: Brand: ENDOPATH XCEL

## (undated) DEVICE — TROCAR: Brand: KII FIOS FIRST ENTRY

## (undated) DEVICE — ELECTRODE LAP L HOOK E-Z CLEAN 33CM -0020

## (undated) DEVICE — TROCARS: Brand: KII® BALLOON BLUNT TIP SYSTEM

## (undated) DEVICE — PACK PBDS LAP CHOLE RF

## (undated) DEVICE — 5 MM CURVED DISSECTORS WITH MONOPOLAR CAUTERY: Brand: ENDOPATH

## (undated) DEVICE — NEEDLE 25G X 1 1/2

## (undated) DEVICE — METZENBAUM ADTEC SINGLE USE DISSECTING SCISSORS, SHAFT ONLY, MONOPOLAR, CURVED TO LEFT, WORKING LENGTH: 12 1/4", (310 MM), DIAM. 5 MM, INSULATED, DOUBLE ACTION, STERILE, DISPOSABLE, PACKAGE OF 10 PIECES: Brand: AESCULAP

## (undated) DEVICE — ADHESIVE SKIN HIGH VISCOSITY EXOFIN 1ML